# Patient Record
Sex: MALE | Race: WHITE | NOT HISPANIC OR LATINO | Employment: FULL TIME | URBAN - METROPOLITAN AREA
[De-identification: names, ages, dates, MRNs, and addresses within clinical notes are randomized per-mention and may not be internally consistent; named-entity substitution may affect disease eponyms.]

---

## 2017-02-10 ENCOUNTER — COMMUNICATION - HEALTHEAST (OUTPATIENT)
Dept: TELEHEALTH | Facility: CLINIC | Age: 52
End: 2017-02-10

## 2017-02-10 ENCOUNTER — OFFICE VISIT - HEALTHEAST (OUTPATIENT)
Dept: INTERNAL MEDICINE | Facility: CLINIC | Age: 52
End: 2017-02-10

## 2017-02-10 DIAGNOSIS — I10 ESSENTIAL HYPERTENSION: ICD-10-CM

## 2017-02-10 DIAGNOSIS — I10 ESSENTIAL HYPERTENSION WITH GOAL BLOOD PRESSURE LESS THAN 140/90: ICD-10-CM

## 2017-02-10 ASSESSMENT — MIFFLIN-ST. JEOR: SCORE: 2081.83

## 2017-03-09 ENCOUNTER — COMMUNICATION - HEALTHEAST (OUTPATIENT)
Dept: FAMILY MEDICINE | Facility: CLINIC | Age: 52
End: 2017-03-09

## 2017-03-09 DIAGNOSIS — I10 HYPERTENSION: ICD-10-CM

## 2018-02-22 ENCOUNTER — COMMUNICATION - HEALTHEAST (OUTPATIENT)
Dept: INTERNAL MEDICINE | Facility: CLINIC | Age: 53
End: 2018-02-22

## 2018-02-22 DIAGNOSIS — I10 HYPERTENSION WITH GOAL BLOOD PRESSURE LESS THAN 140/90: ICD-10-CM

## 2018-05-22 ENCOUNTER — RECORDS - HEALTHEAST (OUTPATIENT)
Dept: ADMINISTRATIVE | Facility: OTHER | Age: 53
End: 2018-05-22

## 2018-07-24 ENCOUNTER — COMMUNICATION - HEALTHEAST (OUTPATIENT)
Dept: INTERNAL MEDICINE | Facility: CLINIC | Age: 53
End: 2018-07-24

## 2018-07-24 DIAGNOSIS — I10 ESSENTIAL HYPERTENSION: ICD-10-CM

## 2018-09-06 ENCOUNTER — COMMUNICATION - HEALTHEAST (OUTPATIENT)
Dept: INTERNAL MEDICINE | Facility: CLINIC | Age: 53
End: 2018-09-06

## 2018-09-06 DIAGNOSIS — I10 HYPERTENSION WITH GOAL BLOOD PRESSURE LESS THAN 140/90: ICD-10-CM

## 2019-03-27 ENCOUNTER — COMMUNICATION - HEALTHEAST (OUTPATIENT)
Dept: FAMILY MEDICINE | Facility: CLINIC | Age: 54
End: 2019-03-27

## 2019-03-28 ENCOUNTER — COMMUNICATION - HEALTHEAST (OUTPATIENT)
Dept: TELEHEALTH | Facility: CLINIC | Age: 54
End: 2019-03-28

## 2019-03-28 ENCOUNTER — OFFICE VISIT - HEALTHEAST (OUTPATIENT)
Dept: FAMILY MEDICINE | Facility: CLINIC | Age: 54
End: 2019-03-28

## 2019-03-28 DIAGNOSIS — T75.3XXA MOTION SICKNESS, INITIAL ENCOUNTER: ICD-10-CM

## 2019-03-28 DIAGNOSIS — E66.01 MORBID OBESITY (H): ICD-10-CM

## 2019-03-28 DIAGNOSIS — I10 HYPERTENSION WITH GOAL BLOOD PRESSURE LESS THAN 140/90: ICD-10-CM

## 2019-03-28 DIAGNOSIS — Z12.5 SCREENING FOR PROSTATE CANCER: ICD-10-CM

## 2019-03-28 DIAGNOSIS — Z13.220 LIPID SCREENING: ICD-10-CM

## 2019-03-28 DIAGNOSIS — H61.23 HEARING LOSS DUE TO CERUMEN IMPACTION, BILATERAL: ICD-10-CM

## 2019-03-28 DIAGNOSIS — Z86.711 HISTORY OF PULMONARY EMBOLISM: ICD-10-CM

## 2019-03-28 DIAGNOSIS — R91.1 PULMONARY NODULE: ICD-10-CM

## 2019-03-28 ASSESSMENT — MIFFLIN-ST. JEOR: SCORE: 2118.8

## 2019-03-29 LAB
ANION GAP SERPL CALCULATED.3IONS-SCNC: 9 MMOL/L (ref 5–18)
BUN SERPL-MCNC: 22 MG/DL (ref 8–22)
CALCIUM SERPL-MCNC: 9.9 MG/DL (ref 8.5–10.5)
CHLORIDE BLD-SCNC: 104 MMOL/L (ref 98–107)
CHOLEST SERPL-MCNC: 204 MG/DL
CO2 SERPL-SCNC: 28 MMOL/L (ref 22–31)
CREAT SERPL-MCNC: 1.03 MG/DL (ref 0.7–1.3)
FASTING STATUS PATIENT QL REPORTED: NO
GFR SERPL CREATININE-BSD FRML MDRD: >60 ML/MIN/1.73M2
GLUCOSE BLD-MCNC: 95 MG/DL (ref 70–125)
HDLC SERPL-MCNC: 42 MG/DL
LDLC SERPL CALC-MCNC: 123 MG/DL
POTASSIUM BLD-SCNC: 4 MMOL/L (ref 3.5–5)
PSA SERPL-MCNC: 0.6 NG/ML (ref 0–3.5)
SODIUM SERPL-SCNC: 141 MMOL/L (ref 136–145)
TRIGL SERPL-MCNC: 196 MG/DL

## 2019-04-02 ENCOUNTER — COMMUNICATION - HEALTHEAST (OUTPATIENT)
Dept: FAMILY MEDICINE | Facility: CLINIC | Age: 54
End: 2019-04-02

## 2019-08-11 ENCOUNTER — COMMUNICATION - HEALTHEAST (OUTPATIENT)
Dept: FAMILY MEDICINE | Facility: CLINIC | Age: 54
End: 2019-08-11

## 2019-08-11 DIAGNOSIS — I10 HYPERTENSION WITH GOAL BLOOD PRESSURE LESS THAN 140/90: ICD-10-CM

## 2019-09-24 ENCOUNTER — RECORDS - HEALTHEAST (OUTPATIENT)
Dept: ADMINISTRATIVE | Facility: OTHER | Age: 54
End: 2019-09-24

## 2019-11-10 ENCOUNTER — COMMUNICATION - HEALTHEAST (OUTPATIENT)
Dept: FAMILY MEDICINE | Facility: CLINIC | Age: 54
End: 2019-11-10

## 2019-11-10 DIAGNOSIS — I10 HYPERTENSION WITH GOAL BLOOD PRESSURE LESS THAN 140/90: ICD-10-CM

## 2020-02-14 ENCOUNTER — COMMUNICATION - HEALTHEAST (OUTPATIENT)
Dept: SCHEDULING | Facility: CLINIC | Age: 55
End: 2020-02-14

## 2020-02-14 DIAGNOSIS — I10 HYPERTENSION WITH GOAL BLOOD PRESSURE LESS THAN 140/90: ICD-10-CM

## 2020-05-20 ENCOUNTER — COMMUNICATION - HEALTHEAST (OUTPATIENT)
Dept: SCHEDULING | Facility: CLINIC | Age: 55
End: 2020-05-20

## 2020-05-20 ENCOUNTER — COMMUNICATION - HEALTHEAST (OUTPATIENT)
Dept: FAMILY MEDICINE | Facility: CLINIC | Age: 55
End: 2020-05-20

## 2020-05-21 ENCOUNTER — OFFICE VISIT - HEALTHEAST (OUTPATIENT)
Dept: FAMILY MEDICINE | Facility: CLINIC | Age: 55
End: 2020-05-21

## 2020-05-21 ENCOUNTER — AMBULATORY - HEALTHEAST (OUTPATIENT)
Dept: NURSING | Facility: CLINIC | Age: 55
End: 2020-05-21

## 2020-05-21 ENCOUNTER — AMBULATORY - HEALTHEAST (OUTPATIENT)
Dept: LAB | Facility: CLINIC | Age: 55
End: 2020-05-21

## 2020-05-21 DIAGNOSIS — Z12.5 SCREENING FOR PROSTATE CANCER: ICD-10-CM

## 2020-05-21 DIAGNOSIS — I10 HYPERTENSION WITH GOAL BLOOD PRESSURE LESS THAN 140/90: ICD-10-CM

## 2020-05-21 DIAGNOSIS — Z13.220 LIPID SCREENING: ICD-10-CM

## 2020-05-21 DIAGNOSIS — E66.01 MORBID OBESITY (H): ICD-10-CM

## 2020-05-21 LAB
ANION GAP SERPL CALCULATED.3IONS-SCNC: 13 MMOL/L (ref 5–18)
BUN SERPL-MCNC: 19 MG/DL (ref 8–22)
CALCIUM SERPL-MCNC: 9.4 MG/DL (ref 8.5–10.5)
CHLORIDE BLD-SCNC: 101 MMOL/L (ref 98–107)
CHOLEST SERPL-MCNC: 233 MG/DL
CO2 SERPL-SCNC: 26 MMOL/L (ref 22–31)
CREAT SERPL-MCNC: 1.14 MG/DL (ref 0.7–1.3)
FASTING STATUS PATIENT QL REPORTED: YES
GFR SERPL CREATININE-BSD FRML MDRD: >60 ML/MIN/1.73M2
GLUCOSE BLD-MCNC: 91 MG/DL (ref 70–125)
HDLC SERPL-MCNC: 60 MG/DL
LDLC SERPL CALC-MCNC: 157 MG/DL
POTASSIUM BLD-SCNC: 4.2 MMOL/L (ref 3.5–5)
PSA SERPL-MCNC: 0.6 NG/ML (ref 0–3.5)
SODIUM SERPL-SCNC: 140 MMOL/L (ref 136–145)
TRIGL SERPL-MCNC: 80 MG/DL

## 2020-05-22 ENCOUNTER — COMMUNICATION - HEALTHEAST (OUTPATIENT)
Dept: FAMILY MEDICINE | Facility: CLINIC | Age: 55
End: 2020-05-22

## 2020-05-22 ENCOUNTER — AMBULATORY - HEALTHEAST (OUTPATIENT)
Dept: FAMILY MEDICINE | Facility: CLINIC | Age: 55
End: 2020-05-22

## 2020-05-22 DIAGNOSIS — E78.5 HYPERLIPIDEMIA, UNSPECIFIED HYPERLIPIDEMIA TYPE: ICD-10-CM

## 2020-05-22 DIAGNOSIS — I10 HYPERTENSION WITH GOAL BLOOD PRESSURE LESS THAN 140/90: ICD-10-CM

## 2020-08-19 ENCOUNTER — COMMUNICATION - HEALTHEAST (OUTPATIENT)
Dept: FAMILY MEDICINE | Facility: CLINIC | Age: 55
End: 2020-08-19

## 2020-08-19 DIAGNOSIS — E78.5 HYPERLIPIDEMIA, UNSPECIFIED HYPERLIPIDEMIA TYPE: ICD-10-CM

## 2020-08-19 RX ORDER — ATORVASTATIN CALCIUM 10 MG/1
10 TABLET, FILM COATED ORAL DAILY
Qty: 90 TABLET | Refills: 2 | Status: SHIPPED | OUTPATIENT
Start: 2020-08-19 | End: 2021-08-12 | Stop reason: SINTOL

## 2020-11-09 ENCOUNTER — COMMUNICATION - HEALTHEAST (OUTPATIENT)
Dept: SCHEDULING | Facility: CLINIC | Age: 55
End: 2020-11-09

## 2020-11-09 ENCOUNTER — OFFICE VISIT - HEALTHEAST (OUTPATIENT)
Dept: FAMILY MEDICINE | Facility: CLINIC | Age: 55
End: 2020-11-09

## 2020-11-09 DIAGNOSIS — H10.31 ACUTE CONJUNCTIVITIS OF RIGHT EYE, UNSPECIFIED ACUTE CONJUNCTIVITIS TYPE: ICD-10-CM

## 2020-11-09 DIAGNOSIS — I10 HYPERTENSION WITH GOAL BLOOD PRESSURE LESS THAN 140/90: ICD-10-CM

## 2020-11-09 DIAGNOSIS — Z20.822 EXPOSURE TO COVID-19 VIRUS: ICD-10-CM

## 2020-11-09 DIAGNOSIS — R05.8 DRY COUGH: ICD-10-CM

## 2020-11-10 ENCOUNTER — COMMUNICATION - HEALTHEAST (OUTPATIENT)
Dept: FAMILY MEDICINE | Facility: CLINIC | Age: 55
End: 2020-11-10

## 2020-11-10 DIAGNOSIS — I10 HYPERTENSION WITH GOAL BLOOD PRESSURE LESS THAN 140/90: ICD-10-CM

## 2020-11-12 ENCOUNTER — AMBULATORY - HEALTHEAST (OUTPATIENT)
Dept: FAMILY MEDICINE | Facility: CLINIC | Age: 55
End: 2020-11-12

## 2020-11-14 RX ORDER — METOPROLOL SUCCINATE 50 MG/1
50 TABLET, EXTENDED RELEASE ORAL DAILY
Qty: 90 TABLET | Refills: 3 | Status: SHIPPED | OUTPATIENT
Start: 2020-11-14 | End: 2021-08-12

## 2020-12-10 ENCOUNTER — VIRTUAL VISIT (OUTPATIENT)
Dept: FAMILY MEDICINE | Facility: OTHER | Age: 55
End: 2020-12-10

## 2020-12-10 NOTE — PROGRESS NOTES
"Date: 12/10/2020 12:00:03  Clinician: Kris James  Clinician NPI: [id number]  Patient: Randal Barajas  Patient : 1965  Patient Address: [address] [place], [place] [address], [place] [id number]  Patient Phone: [phone]  Visit Protocol: URI  Patient Summary:  Randal is a 54 year old ( : 1965 ) male who initiated a [facility] Visit for COVID-19 (Coronavirus) evaluation and screening. When asked the question \"Please sign me up to receive news, health information and promotions from [facility].\", Randal responded \"Yes\".    Randal states his symptoms started 1-2 days ago.   His symptoms consist of a headache, a cough, nasal congestion, nausea, vomiting, rhinitis, myalgia, malaise, a sore throat, and diarrhea. He is experiencing mild difficulty breathing with activities but can speak normally in full sentences.   Symptom details     Nasal secretions: The color of his mucus is clear.    Cough: Randal coughs every 5-10 minutes and his cough is more bothersome at night. Phlegm comes into his throat when he coughs. He does not believe his cough is caused by post-nasal drip. The color of the phlegm is white.     Sore throat: Randal reports having mild throat pain (1-3 on a 10 point pain scale), does not have exudate on his tonsils, and can swallow liquids. He is not sure if the lymph nodes in his neck are enlarged. A rash has not appeared on the skin since the sore throat started.     Headache: He states the headache is mild (1-3 on a 10 point pain scale).      Randal denies having ear pain, wheezing, fever, facial pain or pressure, chills, teeth pain, ageusia, and anosmia. He also denies having recent facial or sinus surgery in the past 60 days.   Precipitating events  Within the past week, Randal has not been exposed to someone with strep throat. He has not recently been exposed to someone with influenza. Randal has been in close contact with the following high risk individuals: children under the age of 5.   " Pertinent COVID-19 (Coronavirus) information  Randal does not work or volunteer as healthcare worker or a . In the past 14 days, Randal has not worked or volunteered at a healthcare facility or group living setting.   In the past 14 days, he also has not lived in a congregate living setting.   Randal has had a close contact with a laboratory-confirmed COVID-19 patient within 14 days of symptom onset. He was exposed at his work. Date Randal was exposed to the laboratory-confirmed COVID-19 patient: 12/06/2020   Additional information about contact with COVID-19 (Coronavirus) patient as reported by the patient (free text): My coworker is currently ill with COVID. He tested positive. I work side by side with him    Since December 2019, Randla has been tested for COVID-19 and has not had upper respiratory infection or influenza-like illness.      Result of COVID-19 test: Negative     Date of his COVID-19 test: 11/16/2020      Pertinent medical history  Randal has taken an antibiotic medication in the past month. Antibiotic details as reported by the patient (free text): Ciprofloxacin ophthalmic solution   He has not been told by his provider to avoid NSAIDs.   Randal does not have diabetes. He denies having immunosuppressive conditions (e.g., chemotherapy, HIV, organ transplant, long-term use of steroids or other immunosuppressive medications, splenectomy). He does not have severe COPD and congestive heart failure. He does not have asthma.   Randal needs a return to work/school note.   Weight: 285 lbs   Randal does not smoke or use smokeless tobacco.   Additional information as reported by the patient (free text): Last month my right eye was infected. I used the drops you prescribed. Well two days ago the infection is back again. My eyes is matted shut every morning. It is also swollen. I was told by last doctor this could be covid   Weight: 285 lbs    MEDICATIONS: lisinopril-hydrochlorothiazide  oral, atorvastatin oral, ALLERGIES: NKDA  Clinician Response:  Dear Randal,   Your symptoms show that you may have coronavirus (COVID-19). This illness can cause fever, cough and trouble breathing. Many people get a mild case and get better on their own. Some people can get very sick.  What should I do?  We would like to test you for this virus.   1. Please call [phone] to schedule your visit. Explain that you were referred by [facility] to have a COVID-19 test. Be ready to share your [facility] visit ID number.  * If you need to schedule in [facility] please call [phone] or for [employer] employees please call [phone].  * If you need to schedule in the [place] area please call [phone]. [employer] employees call [phone].  The following will serve as your written order for this COVID Test, ordered by me, for the indication of suspected COVID [Z20.828]: The test will be ordered in [employer], our electronic health record, after you are scheduled. It will show as ordered and authorized by Hernan Goldman MD.  Order: COVID-19 (Coronavirus) PCR for SYMPTOMATIC testing from [facility].   2. When it's time for your COVID test:  Stay at least 6 feet away from others. (If someone will drive you to your test, stay in the backseat, as far away from the  as you can.)   Cover your mouth and nose with a mask, tissue or washcloth.  Go straight to the testing site. Don't make any stops on the way there or back.      3.Starting now: Stay home and away from others (self-isolate) until:   You've had no fever---and no medicine that reduces fever---for one full day (24 hours). And...   Your other symptoms have gotten better. For example, your cough or breathing has improved. And...   At least 10 days have passed since your symptoms started.       During this time, don't leave the house except for testing or medical care.   Stay in your own room, even for meals. Use your own bathroom if you can.   Stay away from others in your  "home. No hugging, kissing or shaking hands. No visitors.  Don't go to work, school or anywhere else.    Clean \"high touch\" surfaces often (doorknobs, counters, handles, etc.). Use a household cleaning spray or wipes. You'll find a full list of  on the EPA website: www.epa.gov/pesticide-registration/list-n-disinfectants-use-against-sars-cov-2.   Cover your mouth and nose with a mask, tissue or washcloth to avoid spreading germs.  Wash your hands and face often. Use soap and water.  Caregivers in these groups are at risk for severe illness due to COVID-19:  o People 65 years and older  o People who live in a nursing home or long-term care facility  o People with chronic disease (lung, heart, cancer, diabetes, kidney, liver, immunologic)  o People who have a weakened immune system, including those who:   Are in cancer treatment  Take medicine that weakens the immune system, such as corticosteroids  Had a bone marrow or organ transplant  Have an immune deficiency  Have poorly controlled HIV or AIDS  Are obese (body mass index of 40 or higher)  Smoke regularly   o Caregivers should wear gloves while washing dishes, handling laundry and cleaning bedrooms and bathrooms.  o Use caution when washing and drying laundry: Don't shake dirty laundry, and use the warmest water setting that you can.  o For more tips, go to www.cdc.gov/coronavirus/2019-ncov/downloads/10Things.pdf.    How can I take care of myself?    Get lots of rest. Drink extra fluids (unless a doctor has told you not to).   Take Tylenol (acetaminophen) for fever or pain. If you have liver or kidney problems, ask your family doctor if it's okay to take Tylenol.   Adults can take either:    650 mg (two 325 mg pills) every 4 to 6 hours, or...   1,000 mg (two 500 mg pills) every 8 hours as needed.    Note: Don't take more than 3,000 mg in one day. Acetaminophen is found in many medicines (both prescribed and over-the-counter medicines). Read all labels to be " sure you don't take too much.   For children, check the Tylenol bottle for the right dose. The dose is based on the child's age or weight.    If you have other health problems (like cancer, heart failure, an organ transplant or severe kidney disease): Call your specialty clinic if you don't feel better in the next 2 days.       Know when to call 911. Emergency warning signs include:    Trouble breathing or shortness of breath Pain or pressure in the chest that doesn't go away Feeling confused like you haven't felt before, or not being able to wake up Bluish-colored lips or face.  Where can I get more information?   [facility] -- About COVID-19: [email or web address]   CDC -- What to Do If You're Sick: www.cdc.gov/coronavirus/2019-ncov/about/steps-when-sick.html   [facility] -- Ending Home Isolation: www.cdc.gov/coronavirus/2019-ncov/hcp/disposition-in-home-patients.html   [facility] -- Caring for Someone: www.cdc.gov/coronavirus/2019-ncov/if-you-are-sick/care-for-someone.html   [facility] -- Interim Guidance for Hospital Discharge to Home: www.health.[facility].mn.us/diseases/coronavirus/hcp/hospdischarge.pdf   [facility] clinical trials (COVID-19 research studies): clinicalaffairs.[facility]-clinical-trials    Below are the COVID-19 hotlines at the [facility] of Health ([facility]). Interpreters are available.    For health questions: Call 596-808-0157 or 1-540.798.2283 (7 a.m. to 7 p.m.) For questions about schools and childcare: Call 120-155-0362 or 1-874.613.1800 (7 a.m. to 7 p.m.)    Diagnosis: Contact with and (suspected) exposure to other viral communicable diseases  Diagnosis ICD: Z20.828

## 2021-01-27 ENCOUNTER — OFFICE VISIT - HEALTHEAST (OUTPATIENT)
Dept: FAMILY MEDICINE | Facility: CLINIC | Age: 56
End: 2021-01-27

## 2021-01-27 DIAGNOSIS — R21 RASH AND NONSPECIFIC SKIN ERUPTION: ICD-10-CM

## 2021-01-27 DIAGNOSIS — H10.9 CONJUNCTIVITIS OF RIGHT EYE, UNSPECIFIED CONJUNCTIVITIS TYPE: ICD-10-CM

## 2021-01-27 DIAGNOSIS — R05.8 COUGH DUE TO ACE INHIBITOR: ICD-10-CM

## 2021-01-27 DIAGNOSIS — I10 HYPERTENSION WITH GOAL BLOOD PRESSURE LESS THAN 140/90: ICD-10-CM

## 2021-01-27 DIAGNOSIS — T46.4X5A COUGH DUE TO ACE INHIBITOR: ICD-10-CM

## 2021-01-27 DIAGNOSIS — N52.2 DRUG-INDUCED ERECTILE DYSFUNCTION: ICD-10-CM

## 2021-01-27 DIAGNOSIS — Z23 FLU VACCINE NEED: ICD-10-CM

## 2021-01-27 RX ORDER — TADALAFIL 20 MG/1
20 TABLET ORAL DAILY PRN
Qty: 30 TABLET | Status: SHIPPED | OUTPATIENT
Start: 2021-01-27 | End: 2022-04-02

## 2021-01-27 RX ORDER — BETAMETHASONE DIPROPIONATE 0.05 %
OINTMENT (GRAM) TOPICAL
Qty: 30 G | Refills: 0 | Status: SHIPPED | OUTPATIENT
Start: 2021-01-27 | End: 2021-11-23

## 2021-01-27 ASSESSMENT — MIFFLIN-ST. JEOR: SCORE: 2163.03

## 2021-01-29 LAB
VARICELLA ZOSTER DNA COMMENT: NORMAL
VZV DNA SPEC QL NAA+PROBE: NORMAL
VZV PCR SPECIMEN: NORMAL

## 2021-04-25 ENCOUNTER — COMMUNICATION - HEALTHEAST (OUTPATIENT)
Dept: FAMILY MEDICINE | Facility: CLINIC | Age: 56
End: 2021-04-25

## 2021-04-25 DIAGNOSIS — I10 HYPERTENSION WITH GOAL BLOOD PRESSURE LESS THAN 140/90: ICD-10-CM

## 2021-04-26 RX ORDER — LOSARTAN POTASSIUM AND HYDROCHLOROTHIAZIDE 25; 100 MG/1; MG/1
1 TABLET ORAL DAILY
Qty: 90 TABLET | Refills: 0 | Status: SHIPPED | OUTPATIENT
Start: 2021-04-26 | End: 2021-08-05

## 2021-05-27 VITALS — DIASTOLIC BLOOD PRESSURE: 120 MMHG | OXYGEN SATURATION: 95 % | SYSTOLIC BLOOD PRESSURE: 158 MMHG | HEART RATE: 99 BPM

## 2021-05-27 NOTE — PATIENT INSTRUCTIONS - HE
Check your blood pressure periodically and if you continue to be greater than 140/90, come on in so we can adjust your medications. Weight loss will be a big help in controlling your blood pressure.    You will get a call about getting that CT scan set up to follow up on that pulmonary nodule that we talked about.    You can use an over the counter kit called Debrox which helps to soften your ear wax so it doesn't get stuck like it has.  Avoid using q-tips as this can make it worse.    Patient Education   Scopolamine Transdermal patch - 72 hour  What is this medicine?  SCOPOLAMINE (skoe MEENA a meen) is used to prevent nausea and vomiting caused by motion sickness, anesthesia and surgery.  This medicine may be used for other purposes; ask your health care provider or pharmacist if you have questions.  What should I tell my health care provider before I take this medicine?  They need to know if you have any of these conditions:    glaucoma    kidney or liver disease    an unusual or allergic reaction (especially skin allergy) to scopolamine, atropine, other medicines, foods, dyes, or preservatives    pregnant or trying to get pregnant    breast-feeding  How should I use this medicine?  This medicine is for external use only. Follow the directions on the prescription label. One patch contains enough medicine to prevent motion sickness for up to 3 days. Apply the patch at least 4 hours before you need it and only wear one disc at a time. Choose an area behind the ear, that is clean, dry, hairless and free from any cuts or irritation. Wipe the area with a clean dry tissue. Peel off the plastic backing of the skin patch, trying not to touch the adhesive side with your hands. Do not cut the patches. Firmly apply to the area you have chosen, with the metallic side of the patch to the skin and the tan-colored side showing. Once firmly in place, wash your hands well with soap and water. Remove the disc after 3 days, or sooner  if you no longer need it. After removing the patch, wash your hands and the area behind your ear thoroughly with soap and water. The patch will still contain some medicine after use. To avoid accidental contact or ingestion by children or pets, fold the used patch in half with the sticky side together and throw away in the trash out of the reach of children and pets. If you need to use a second patch after you remove the first, place it behind the other ear.  Talk to your pediatrician regarding the use of this medicine in children. Special care may be needed.  Overdosage: If you think you have taken too much of this medicine contact a poison control center or emergency room at once.  NOTE: This medicine is only for you. Do not share this medicine with others.  What if I miss a dose?  Make sure you apply the patch at least 4 hours before you need it. You can apply it the night before traveling.  What may interact with this medicine?    benztropine    bethanechol    medicines for anxiety or sleeping problems like diazepam or temazepam    medicines for hay fever and other allergies    medicines for mental depression    muscle relaxants  This list may not describe all possible interactions. Give your health care provider a list of all the medicines, herbs, non-prescription drugs, or dietary supplements you use. Also tell them if you smoke, drink alcohol, or use illegal drugs. Some items may interact with your medicine.  What should I watch for while using this medicine?  Keep the patch dry, if possible, to prevent it from falling off. Limited contact with water, however, as in bathing or swimming, will not affect the system. If the patch falls off, throw it away and put a new one behind the other ear.  You may get drowsy or dizzy. Do not drive, use machinery, or do anything that needs mental alertness until you know how this medicine affects you. Do not stand or sit up quickly, especially if you are an older patient.  This reduces the risk of dizzy or fainting spells. Alcohol may interfere with the effect of this medicine. Avoid alcoholic drinks.  Your mouth may get dry. Chewing sugarless gum or sucking hard candy, and drinking plenty of water may help. Contact your doctor if the problem does not go away or is severe.  This medicine may cause dry eyes and blurred vision. If you wear contact lenses you may feel some discomfort. Lubricating drops may help. See your eye doctor if the problem does not go away or is severe.  If you are going to have a magnetic resonance imaging (MRI) procedure, tell your MRI technician if you have this patch on your body. It must be removed before a MRI.  What side effects may I notice from receiving this medicine?  Side effects that you should report to your doctor or health care professional as soon as possible:    agitation, nervousness, confusion    blurred vision and other eye problems    dizziness, drowsiness    eye pain or redness in the whites of the eye    hallucinations    pain or difficulty passing urine    skin rash, itching    vomiting  Side effects that usually do not require medical attention (report to your doctor or health care professional if they continue or are bothersome):    headache    nausea  This list may not describe all possible side effects. Call your doctor for medical advice about side effects. You may report side effects to FDA at 4-487-FDA-6191.  Where should I keep my medicine?  Keep out of the reach of children.  Store at room temperature between 20 and 25 degrees C (68 and 77 degrees F). Throw away any unused medicine after the expiration date. When you remove a patch, fold it and throw it in the trash as described above.  NOTE: This sheet is a summary. It may not cover all possible information. If you have questions about this medicine, talk to your doctor, pharmacist, or health care provider.  NOTE:This sheet is a summary. It may not cover all possible information.  If you have questions about this medicine, talk to your doctor, pharmacist, or health care provider. Copyright  2015 [employer]

## 2021-05-27 NOTE — PROGRESS NOTES
"Chief Complaint   Patient presents with     Medication Request     Would like medication for motion sickness since he is leaving for 2 weeks on a cruise.      Medication Refill     Waterpill       HPI: Patient presents today requesting medication for motion sickness as he is going to be leaving on a cruise tomorrow.  He is taking oral medication (he is unsure of what kind, but possibly Dramamine) in the past which is only been mildly effective.  He says that he heard from a friend that there is a patch that he could try and wonders if he could get that today.    The patient is hypertensive in clinic.  He does have prescriptions for hydrochlorothiazide and metoprolol, but admits that he has not been taking his hydrochlorothiazide as he has run out.  He is overdue for an office visit and med check.  No chest pain, palpitations, lightheadedness, dizziness, numbness, tingling, gait instability, vision changes.    The patient has a history of a 3 mm pulmonary nodule found on CTA when he was diagnosed with pulmonary emboli in the past.  In regards to the embolism, no current anticoagulation.  No recurrence of clot.  If the PE occurred postoperatively.  Patient has no smoking history.  Pulmonary nodule looked low risk.  He is interested in following up on this.    ROS:Review of Systems - History obtained from the patient  General ROS: negative  Ophthalmic ROS: negative  ENT ROS: negative  Respiratory ROS: negative  Cardiovascular ROS: negative  Gastrointestinal ROS: negative  Neurological ROS: negative    SH: The Patient's  reports that  has never smoked. he has never used smokeless tobacco.      FH: The Patient's family history is not on file.     Meds:    No current outpatient medications on file prior to visit.     No current facility-administered medications on file prior to visit.        O:  BP (!) 142/104   Pulse 95   Temp 98.1  F (36.7  C) (Oral)   Ht 5' 10.5\" (1.791 m)   Wt (!) 281 lb (127.5 kg)   SpO2 95%   " BMI 39.75 kg/m      Physical Examination:   General appearance - alert, well appearing, and in no distress  Mental status - alert, oriented to person, place, and time  Eyes - pupils equal and reactive, extraocular eye movements intact  Ears -large bilateral cerumen impaction.  Canals normal.  Nose - normal and patent, no erythema, discharge or polyps  Mouth - mucous membranes moist, pharynx normal without lesions  Neck - supple, no significant adenopathy  Lymphatics - no palpable lymphadenopathy, no hepatosplenomegaly  Chest - clear to auscultation, no wheezes, rales or rhonchi, symmetric air entry  Heart - normal rate and regular rhythm, S1 and S2 normal, no murmurs noted  Abdomen - soft, nontender, nondistended, no masses or organomegaly  Neurological - alert, oriented, normal speech, no focal findings or movement disorder noted, neck supple without rigidity, cranial nerves II through XII intact, motor and sensory grossly normal bilaterally, normal muscle tone, no tremors, strength 5/5  Extremities - peripheral pulses normal, no pedal edema, no clubbing or cyanosis  Skin - normal coloration and turgor, no rashes, no suspicious skin lesions noted      A/P:     Problem List Items Addressed This Visit        ENT/CARD/PULM/ENDO Problems    Hypertension with goal blood pressure less than 140/90 - Primary    Relevant Medications    hydroCHLOROthiazide (HYDRODIURIL) 25 MG tablet    metoprolol succinate (TOPROL-XL) 50 MG 24 hr tablet    Other Relevant Orders    Basic Metabolic Panel    Pulmonary nodule    Relevant Orders    CT Chest Without Contrast       Other    History of pulmonary embolism    Obesity (BMI 35.0-39.9) with comorbidity (H)      Other Visit Diagnoses     Motion sickness, initial encounter        Relevant Medications    scopolamine (TRANSDERM-SCOP) 1.5 mg transdermal patch    Screening for prostate cancer        Relevant Orders    PSA (Prostatic-Specific Antigen), Annual Screen    Lipid screening         Relevant Orders    Lipid [place] RANDOM    Hearing loss due to cerumen impaction, bilateral                1. Hypertension with goal blood pressure less than 140/90  Uncontrolled, most likely due to not taking antihypertensives due to running out.    - hydroCHLOROthiazide (HYDRODIURIL) 25 MG tablet; Take 0.5 tablets (12.5 mg total) by mouth daily.  Dispense: 45 tablet; Refill: 1  - metoprolol succinate (TOPROL-XL) 50 MG 24 hr tablet; Take 1 tablet (50 mg total) by mouth daily.  Dispense: 90 tablet; Refill: 1  - Basic Metabolic Panel    2. History of pulmonary embolism  Plans on going on a plane trip.  Encourage movement during his trip.  Consider prophylactic aspirin.    3. Pulmonary nodule  Most likely benign pulmonary nodule, but patient is interested in following up which is reasonable.  If it continues to be stable, no further follow-up will be needed.  - CT Chest Without Contrast; Future    4. Motion sickness, initial encounter  Prescription for scopolamine patch sent to the pharmacy.  Discussed potential side effects and how to use.    - scopolamine (TRANSDERM-SCOP) 1.5 mg transdermal patch; Place 1 patch on the skin every third day.  Dispense: 7 patch; Refill: 0    5. Screening for prostate cancer  - PSA (Prostatic-Specific Antigen), Annual Screen    6. Lipid screening  - Lipid [place] RANDOM    7. Hearing loss due to cerumen impaction, bilateral  A large amount of cerumen was removed bilaterally initially with lavage to loosen and then manually manipulated out with lighted curette.    8. Morbid obesity (H)  Discussed with weight loss options and encouraged lifestyle changes.        Willian Wright, CNP

## 2021-05-27 NOTE — TELEPHONE ENCOUNTER
Pt leaving 3/29/19 for vacation. Hasn't seen dr Gloria since 2016 and all other doctors that he's seen are no longer working at this location. Appt has been scheduled for tomorrow. Will discuss request at that time. H.DeGree, CMA

## 2021-05-27 NOTE — TELEPHONE ENCOUNTER
Medication Request  Medication name: motion sickness patches   Pharmacy Name and Location:  [facility]   Reason for request: Pt leaving 3/22/19  for  cruise x 14 days, Pt requesting patches for motion sickness, Please call patient and advise  When did you use medication last ?: ?     Okay to leave a detailed message: yes

## 2021-05-30 ENCOUNTER — RECORDS - HEALTHEAST (OUTPATIENT)
Dept: ADMINISTRATIVE | Facility: CLINIC | Age: 56
End: 2021-05-30

## 2021-05-30 VITALS — HEIGHT: 72 IN | WEIGHT: 267.6 LBS | BODY MASS INDEX: 36.24 KG/M2

## 2021-05-31 ENCOUNTER — RECORDS - HEALTHEAST (OUTPATIENT)
Dept: ADMINISTRATIVE | Facility: CLINIC | Age: 56
End: 2021-05-31

## 2021-05-31 NOTE — TELEPHONE ENCOUNTER
Medication refill request denied: requested refill too early      Disp Refills Start End    metoprolol succinate (TOPROL-XL) 50 MG 24 hr tablet 90 tablet 1 3/28/2019     Sig - Route: Take 1 tablet (50 mg total) by mouth daily. - Oral    Sent to pharmacy as: metoprolol succinate (TOPROL-XL) 50 MG 24 hr tablet    E-Prescribing Status: Receipt confirmed by pharmacy (3/28/2019 12:34 PM CDT)      Faye Renner RN [facility] Triage/Med Refill 8/11/2019 6:59 PM

## 2021-06-02 VITALS — HEIGHT: 71 IN | WEIGHT: 281 LBS | BODY MASS INDEX: 39.34 KG/M2

## 2021-06-03 NOTE — TELEPHONE ENCOUNTER
Refill Approved    Rx renewed per Medication Renewal Policy. Medication was last renewed on 3/28/19.    Shanna Goldman, [facility] Triage/Med Refill 11/10/2019     Requested Prescriptions   Pending Prescriptions Disp Refills     hydroCHLOROthiazide (HYDRODIURIL) 25 MG tablet [Pharmacy Med Name: HYDROCHLOROTHIAZIDE 25MG TABLETS] 45 tablet 0     Sig: TAKE 1/2 TABLET(12.5 MG) BY MOUTH DAILY       Diuretics/Combination Diuretics Refill Protocol  Passed - 11/10/2019  1:29 PM        Passed - Visit with PCP or prescribing provider visit in past 12 months     Last office visit with prescriber/PCP: 3/28/2019 Willian Wright CNP OR same dept: 3/28/2019 Willian Wright CNP OR same specialty: 3/28/2019 Willian Wright CNP  Last physical: Visit date not found Last MTM visit: Visit date not found   Next visit within 3 mo: Visit date not found  Next physical within 3 mo: Visit date not found  Prescriber OR PCP: Willian Wright CNP  Last diagnosis associated with med order: 1. Hypertension with goal blood pressure less than 140/90  - hydroCHLOROthiazide (HYDRODIURIL) 25 MG tablet [Pharmacy Med Name: HYDROCHLOROTHIAZIDE 25MG TABLETS]; TAKE 1/2 TABLET(12.5 MG) BY MOUTH DAILY  Dispense: 45 tablet; Refill: 0    If protocol passes may refill for 12 months if within 3 months of last provider visit (or a total of 15 months).             Passed - Serum Potassium in past 12 months      Lab Results   Component Value Date    Potassium 3.7 06/28/2019             Passed - Serum Sodium in past 12 months      Lab Results   Component Value Date    Sodium 136 06/28/2019             Passed - Blood pressure on file in past 12 months     BP Readings from Last 1 Encounters:   06/28/19 (!) 155/102             Passed - Serum Creatinine in past 12 months      Creatinine   Date Value Ref Range Status   06/28/2019 0.87 0.70 - 1.30 mg/dL Final

## 2021-06-04 VITALS — WEIGHT: 270 LBS | BODY MASS INDEX: 36.62 KG/M2

## 2021-06-05 VITALS
TEMPERATURE: 98.8 F | OXYGEN SATURATION: 97 % | HEIGHT: 71 IN | HEART RATE: 85 BPM | BODY MASS INDEX: 40.46 KG/M2 | WEIGHT: 289 LBS | DIASTOLIC BLOOD PRESSURE: 84 MMHG | RESPIRATION RATE: 20 BRPM | SYSTOLIC BLOOD PRESSURE: 116 MMHG

## 2021-06-06 NOTE — TELEPHONE ENCOUNTER
Refill Approved    Rx renewed per Medication Renewal Policy. Medication was last renewed on 11/10/19.    Susie Sykes, [facility] Triage/Med Refill 2/17/2020     Requested Prescriptions   Pending Prescriptions Disp Refills     hydroCHLOROthiazide (HYDRODIURIL) 25 MG tablet 45 tablet 0     Sig: TAKE 1/2 TABLET(12.5 MG) BY MOUTH DAILY       Diuretics/Combination Diuretics Refill Protocol  Passed - 2/14/2020  8:15 AM        Passed - Visit with PCP or prescribing provider visit in past 12 months     Last office visit with prescriber/PCP: 3/28/2019 Willian Wright CNP OR same dept: Visit date not found OR same specialty: Visit date not found  Last physical: Visit date not found Last MTM visit: Visit date not found   Next visit within 3 mo: Visit date not found  Next physical within 3 mo: Visit date not found  Prescriber OR PCP: Willian Wright CNP  Last diagnosis associated with med order: 1. Hypertension with goal blood pressure less than 140/90  - hydroCHLOROthiazide (HYDRODIURIL) 25 MG tablet  Dispense: 45 tablet; Refill: 0    If protocol passes may refill for 12 months if within 3 months of last provider visit (or a total of 15 months).             Passed - Serum Potassium in past 12 months      Lab Results   Component Value Date    Potassium 3.7 06/28/2019             Passed - Serum Sodium in past 12 months      Lab Results   Component Value Date    Sodium 136 06/28/2019             Passed - Blood pressure on file in past 12 months     BP Readings from Last 1 Encounters:   06/28/19 (!) 155/102             Passed - Serum Creatinine in past 12 months      Creatinine   Date Value Ref Range Status   06/28/2019 0.87 0.70 - 1.30 mg/dL Final

## 2021-06-08 NOTE — PROGRESS NOTES
ASSESSMENT:  1. Essential hypertension with goal blood pressure less than 140/90  Poor control on low-dose hydrochlorothiazide.  Tachycardia and sleep apnea noted.  Would begin beta blocker.  Discussed goal readings    Reviewed echocardiogram showing left ventricular hypertrophy and hyperdynamic which would probably be helped by a beta blocker also    2. Essential hypertension  He does describe some element of salt sensitivity so increasing hydrochlorothiazide would seem reasonable  - hydroCHLOROthiazide (HYDRODIURIL) 25 MG tablet; Take 1 tablet (25 mg total) by mouth daily.  Dispense: 90 tablet; Refill: 3    3.  Sleep apnea area discussed probable sleep apnea and that following through with this evaluation is a good idea regarding his high blood pressure    PLAN:  Patient Instructions   Standard 2 approaches for high blood pressure are water pill or beta blocker    Metoprolol 50 mgs once a day in the morning.  Goal blood pressure is 120-140 over 70-80 and no higher than 140/90    Goal pulse with metoprolol is 60-70, and ideally not below 55    Range of metoprolol is 25 mgs up to 200 mgs    People who are sensitive to salt do well on water pills    Increase HCTZ to 25 mgs a full pill each morning    Virtually all medicines show most of their effect within 2-3 days      No orders of the defined types were placed in this encounter.    Medications Discontinued During This Encounter   Medication Reason     hydroCHLOROthiazide (HYDRODIURIL) 25 MG tablet Reorder       No Follow-up on file.    CHIEF COMPLAINT:  Chief Complaint   Patient presents with     Hypertension       HISTORY OF PRESENT ILLNESS:  Randal is a 51 y.o. male presenting to the clinic today with concerns of hypertension. He is normally followed by Dr. Gloria. He recently went to the dentist and was told that his blood pressure was too elevated to due work. He recalls that his blood pressure was 170/111 at the dentist's. He was placed on HCTZ 12.5 mg last  year while he was in the hospital secondary to DVT and pulmonary emboli. He recalls that for the last 10 years his heart rate and has been elevated since he stopped running. He denies feeling unwell but mentions intermittent dizziness and light headedness and tinnitus. He believes that he retains salt and water as his watch and ring occasionally become tight when he has some peripheral edema. He also notes that he seems to hold more fluid after drinking beer.    Sleep apnea: He snores at night and his wife has informed him that his snoring is very loud. She has asked him to sleep in another room as his snoring has become too intrusive on her sleep. He has scheduled to see the sleep clinic but missed the appointment and plans on rescheduling  sometime soon.     REVIEW OF SYSTEMS:   He has not been on any blood thinners since March.   All other systems are negative.    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     TOBACCO USE:  History   Smoking Status     Never Smoker   Smokeless Tobacco     Not on file       VITALS:  Vitals:    02/10/17 1338   BP: 136/88   Pulse: (!) 112   Weight: (!) 267 lb 9.6 oz (121.4 kg)   Height: 6' (1.829 m)     Wt Readings from Last 3 Encounters:   02/10/17 (!) 267 lb 9.6 oz (121.4 kg)   11/02/16 (!) 267 lb (121.1 kg)   01/28/16 (!) 272 lb (123.4 kg)       PHYSICAL EXAM:  Constitutional:  Reveals an alert, pleasant, talkative middle aged male.  Vitals:  Per nursing notes.  Ears:  Small amount of cerumen in the right ear. Left ear clear.  Oropharynx:  Narrow pharynx. Without posterior nasal drainage or thrush.  Neck:  Supple, thyroid not palpable.  Cardiac:  Regular rate and rhythm without murmurs, rubs, or gallops.  Legs without edema. Palpation of the radial pulse regular.  Lungs: Clear.  Respiratory effort normal.  Neurologic:  Cranial nerves II-XII intact.     Psychiatric:  Mood appropriate, memory intact.     QUALITY MEASURES:  The following high BMI  interventions were performed this visit: encouragement to exercise    ADDITIONAL HISTORY SUMMARIZED (2): Notes from Dr. Malone reviewed from 11/2016 regarding hypertension. Discharge summary from 12/2015 reviewed regarding DVT  DECISION TO OBTAIN EXTRA INFORMATION (1): Care everywhere accessed.   RADIOLOGY TESTS (1): None.  LABS (1): None.  MEDICINE TESTS (1): Echocardiogram reviewed from 11/2015.  INDEPENDENT REVIEW (2 each): None.     The visit lasted a total of 20 minutes face to face with the patient. Over 50% of the time was spent counseling and educating the patient about hypertension.    ICallum, am scribing for and in the presence of, Dr. Thompson.    I, Dr. Thompson, personally performed the services described in this documentation, as scribed by Callum Bridges in my presence, and it is both accurate and complete.    MEDICATIONS:  Current Outpatient Prescriptions   Medication Sig Dispense Refill     hydroCHLOROthiazide (HYDRODIURIL) 25 MG tablet Take 1 tablet (25 mg total) by mouth daily. 90 tablet 3     metoprolol succinate (TOPROL-XL) 50 MG 24 hr tablet Take 1 tablet (50 mg total) by mouth daily. 90 tablet 3     No current facility-administered medications for this visit.        Total data points: 4.   TIME IN:1:51 PM  TIME OUT: 2:11 PM

## 2021-06-08 NOTE — PROGRESS NOTES
"Randal Barajas is a 54 y.o. male who is being evaluated via a billable video visit.      The patient has been notified of following:     \"This video visit will be conducted via a call between you and your physician/provider. We have found that certain health care needs can be provided without the need for an in-person physical exam.  This service lets us provide the care you need with a video conversation.  If a prescription is necessary we can send it directly to your pharmacy.  If lab work is needed we can place an order for that and you can then stop by our lab to have the test done at a later time.    Video visits are billed at different rates depending on your insurance coverage. Please reach out to your insurance provider with any questions.    If during the course of the call the physician/provider feels a video visit is not appropriate, you will not be charged for this service.\"    Patient has given verbal consent to a Video visit? Yes    Patient would like to receive their AVS by AVS Preference: Deysi.    Patient would like the video invitation sent by: Text to cell phone: [phone].    Will anyone else be joining your video visit? No        Video Start Time: 0900    Patient presents today for medication check.  It has been >1-year since I have last seen him.  At that time he was hypertensive but had run out of his metoprolol and hydrochlorothiazide.  Refills were sent to the pharmacy.  The patient admits that within the last year he did stop taking them because he felt like he did not want to take daily medication.  No symptoms with this.  He did not check his blood pressure with it.  He notes that he then restarted them on his own.    Patient denies chest pain, palpitations, lightheadedness, dizziness, numbness, tingling, headache, vision changes.    Patient does not check his blood pressure at home.  He says that when he goes to the dentist he was told that his blood pressure is elevated.  He does " not have any specific numbers.    He has a history of a pulmonary nodule found incidentally on scanned for PE years ago.  We have talked about rescanning it if he was interested last year, but at this point he does not think he is.    He continues to struggle with obesity.  Exercise is difficult and his diet is indiscriminate more often than not.    Additional provider notes: GENERAL: Healthy, alert and no distress  EYES: Eyes grossly normal to inspection. No discharge or erythema, or obvious scleral/conjunctival abnormalities.  RESP: No audible wheeze, cough, or visible cyanosis.  No visible retractions or increased work of breathing.    NEURO: Cranial nerves grossly intact. Mentation and speech appropriate for age.  PSYCH: Mentation appears normal, affect normal/bright, judgement and insight intact, normal speech and appearance well-groomed    1. Hypertension with goal blood pressure less than 140/90  Basic Metabolic Panel   2. Obesity (BMI 35.0-39.9) with comorbidity (H)     3. Screening for prostate cancer  PSA, Diagnostic (Prostatic-Specific Antigen)   4. Lipid screening  Lipid Cascade RANDOM     Persistently uncontrolled blood pressure based on readings from the dentist office.  I would like for him to come in for nurse only blood pressure check and update lab so that we can get accurate numbers and adjust from there.  Weight loss encouraged.    Video-Visit Details    Type of service:  Video Visit    Video End Time (time video stopped): 0925  Originating Location (pt. Location): Home    Distant Location (provider location):  [facility]/OB     Platform used for Video Visit: Vinny Wright, ELIZA

## 2021-06-08 NOTE — PROGRESS NOTES
I met with Randal Barajas at the request of Willian Wright NP to recheck his blood pressure.  Blood pressure medications on the MAR were reviewed with patient.    Patient has taken all medications as per usual regimen: He has been taking medications for the past 2 weeks.   Patient reports tolerating them without any issues or concerns: Yes    Vitals:    05/21/20 1350 05/21/20 1352   BP: (!) 160/120 (!) 158/120   Pulse: (!) 101 99   SpO2: 95% 95%       After 5 minutes, the patient's blood pressure remained greater than or equal to 140/90.    Is the patient currently having any chest pain?  No  Does the patient currently have a headache?  No   the patient currently have any vision changes? No  Does the patient currently have any nausea? No  Does the patient currently have any abdominal pain? No    Patient was instructed to Per Willian Wright NP pt to go home. PCP will call him tomorrow. .

## 2021-06-08 NOTE — TELEPHONE ENCOUNTER
Left message stating pt is due for bp check. Please help schedule with Willian Wright CNP this week. Video visits are preferable (all we need is a smartphone) but if pt is not able to then telephone visit is fine.     Pearl Araujo, CMA

## 2021-06-10 NOTE — TELEPHONE ENCOUNTER
Refill Approved    Rx renewed per Medication Renewal Policy. Medication was last renewed on 5/22/20.    June Walters, [facility] Connection Triage/Med Refill 8/19/2020     Requested Prescriptions   Pending Prescriptions Disp Refills     atorvastatin (LIPITOR) 10 MG tablet 90 tablet 0     Sig: Take 1 tablet (10 mg total) by mouth daily.       Statins Refill Protocol (Hmg CoA Reductase Inhibitors) Passed - 8/19/2020  7:23 AM        Passed - PCP or prescribing provider visit in past 12 months      Last office visit with prescriber/PCP: 3/28/2019 Willian Wright CNP OR same dept: Visit date not found OR same specialty: 3/28/2019 Willian Wright CNP  Last physical: Visit date not found Last MTM visit: Visit date not found   Next visit within 3 mo: Visit date not found  Next physical within 3 mo: Visit date not found  Prescriber OR PCP: Willian Wright CNP  Last diagnosis associated with med order: 1. Hyperlipidemia, unspecified hyperlipidemia type  - atorvastatin (LIPITOR) 10 MG tablet; Take 1 tablet (10 mg total) by mouth daily.  Dispense: 90 tablet; Refill: 0    If protocol passes may refill for 12 months if within 3 months of last provider visit (or a total of 15 months).

## 2021-06-12 NOTE — TELEPHONE ENCOUNTER
"Pt reports nasal congestion and cough x two days.  New Today -> \"R eye was mattery upon waking.\"  Now \"watery drainage.\"  Eyelids mildly swollen.  Sclera reddened.  Eye feels irritated; no pain.    SECOND ISSUE:  Possible exposure to covid.  Co-worker tested positive.  Pt himself might have been exposed to co-worker at time co-worker became symptomatic  Therefore wishes to request order for covid testing as well as possible Rx antibiotic eye gtts.    Pt agrees to virtual video visit with a provider to request order for covid testing as well as discuss treatment for eye symptoms.  Transferred to a  for an appointment now.    (Also discussed self care measures for cough and eye drainage -> warm fluids w/honey, OTC plain Mucinex, warm moist cottonball treatments q 2 hours as feasible to eye -> pt verbalizes understanding, agrees to plan.)    Mikki Nielson RN  Care Connection Triage     Reason for Disposition    Patient wants to be seen    [1] COVID-19 infection suspected by caller or triager AND [2] mild symptoms (cough, fever, or others) AND [3] no complications or SOB    Additional Information    Negative: Eye exposure to chemical or fumes    Negative: Redness of white of eye (sclera), but no pus or only a small amount of brief pus    Negative: SEVERE pain (e.g., excruciating)    Negative: Patient sounds very sick or weak to the triager    Negative: MODERATE eye pain (e.g., interferes with normal activities)    Negative: Blurred vision    Negative: Cloudy spot or sore seen on the cornea (clear part of the eye)    Negative: Eyelids are very swollen (shut or almost)    Negative: Eyelid (outer) is very red and painful (or tender to touch)    Negative: Fever > 103 F (39.4 C)    Negative: Discharge from penis    Negative: New or abnormal vaginal discharge    Negative: Using antibiotic eyedrops > 3 days but pus persists    Negative: Lots of yellow or green nasal discharge    Negative: Weak immune system (e.g., HIV " positive, cancer chemo, splenectomy, organ transplant, chronic steroids)    Negative: SEVERE difficulty breathing (e.g., struggling for each breath, speaks in single words)    Negative: Difficult to awaken or acting confused (e.g., disoriented, slurred speech)    Negative: Bluish (or gray) lips or face now    Negative: Shock suspected (e.g., cold/pale/clammy skin, too weak to stand, low BP, rapid pulse)    Negative: Sounds like a life-threatening emergency to the triager    Negative: [1] COVID-19 exposure AND [2] no symptoms    Negative: COVID-19 and Breastfeeding, questions about    Negative: [1] Adult with possible COVID-19 symptoms AND [2] triager concerned about severity of symptoms or other causes    Negative: SEVERE or constant chest pain or pressure (Exception: mild central chest pain, present only when coughing)    Negative: MODERATE difficulty breathing (e.g., speaks in phrases, SOB even at rest, pulse 100-120)    Negative: Patient sounds very sick or weak to the triager    Negative: MILD difficulty breathing (e.g., minimal/no SOB at rest, SOB with walking, pulse <100)    Negative: Chest pain or pressure    Negative: Fever > 103 F (39.4 C)    Negative: [1] Fever > 101 F (38.3 C) AND [2] age > 60    Negative: [1] Fever > 100.0 F (37.8 C) AND [2] bedridden (e.g., nursing home patient, CVA, chronic illness, recovering from surgery)    Negative: HIGH RISK patient (e.g., age > 64 years, diabetes, heart or lung disease, weak immune system) (Exception: Has already been evaluated by healthcare provider and has no new or worsening symptoms)    Negative: Fever present > 3 days (72 hours)    Negative: [1] Fever returns after gone for over 24 hours AND [2] symptoms worse or not improved    Negative: [1] Continuous (nonstop) coughing interferes with work or school AND [2] no improvement using cough treatment per protocol    [facility] Specific Disposition - [facility] Specific Patient Instructions  COVID  19 Nurse Triage Plan/Patient Instructions    Please be aware that novel coronavirus (COVID-19) may be circulating in the community. If you develop symptoms such as fever, cough, or SOB or if you have concerns about the presence of another infection including coronavirus (COVID-19), please contact your health care provider or visit [email or web address].       Virtual Visit with provider recommended. Reference Visit Selection Guide.    Thank you for taking steps to prevent the spread of this virus.  Limit your contact with others.  Wear a simple mask to cover your cough.  Wash your hands well and often.    Resources  [facility]: About COVID-19: [email or web address]  CDC: What to Do If You're Sick: www.cdc.gov/coronavirus/2019-ncov/about/steps-when-sick.html  CDC: Ending Home Isolation: www.cdc.gov/coronavirus/2019-ncov/hcp/disposition-in-home-patients.html   CDC: Caring for Someone: www.cdc.gov/coronavirus/2019-ncov/if-you-are-sick/care-for-someone.html   [facility]: Interim Guidance for Hospital Discharge to Home: www.[email or web address]/diseases/coronavirus/hcp/hospdischarge.pdf  [facility] clinical trials (COVID-19 research studies): [email or web address].[email or web address]-clinical-trials   Below are the COVID-19 hotlines at the [place] Department of [facility] ([facility]). Interpreters are available.   For health questions: Call 134-180-5069 or 1-603.400.2337 (7 a.m. to 7 p.m.)  For questions about schools and childcare: Call 009-839-5898 or 1-973.559.3878 (7 a.m. to 7 p.m.)    Protocols used: EYE - PUS OR [id number], CORONAVIRUS (COVID-19) DIAGNOSED OR [id number] 8.4.20

## 2021-06-12 NOTE — PROGRESS NOTES
"Randal Barajas is a 54 y.o. male who is being evaluated via a billable video visit.      The patient has been notified of following:     \"This video visit will be conducted via a call between you and your physician/provider. We have found that certain health care needs can be provided without the need for an in-person physical exam.  This service lets us provide the care you need with a video conversation.  If a prescription is necessary we can send it directly to your pharmacy.  If lab work is needed we can place an order for that and you can then stop by our lab to have the test done at a later time.    Video visits are billed at different rates depending on your insurance coverage. Please reach out to your insurance provider with any questions.    If during the course of the call the physician/provider feels a video visit is not appropriate, you will not be charged for this service.\"    Patient has given verbal consent to a Video visit? Yes  How would you like to obtain your AVS? AVS Preference: MyChart.  If dropped by the video visit, the video invitation should be sent to: Text to cell phone: [phone]  Will anyone else be joining your video visit? No        Video Start Time: 10:01 AM    SUBJECTIVE: Randal Barajas is a 54 y.o. White or  male who presents today by video with a complaint of a dry cough that is ticklish in the back of his throat.  He has had congestion for the last 2 days.  He particularly says he feels it in his right lung and it makes him want to gag when he coughed so hard.  Yesterday he was with his grandson who mentioned that his eye looked irritated.  This morning he woke with his right eye mattered shut.  He notes that there is a copious yellow discharge and although he really washes it away,soon it will come right back.  His eyeball is reddened.  His eyelids are a bit swollen and the eyeball burns.  He says he cannot even see out of it well.  He denies fever and sore throat.  " He denies stomach issues or rash.  He tells me that he knows 2 people that ended up being Covid positive that he has had contact with.  One is a coworker that he worked with all last week.  Yesterday that coworker's wife who was sick tested positive.  The coworker has not been sick and has not been tested yet.  He also has a friend who had Covid and tested positive 9 or 10 days ago.  He had been around him right before he got sick.  His work is wondering if he should stay home.  He usually works Monday through Wednesday.  We discussed he should stay home until he is cleared.  He has been tested in the past 3 or 4 months ago.  At that time he was negative.  We also discussed that his blood pressure was not controlled at his last visit with Willian.  He has not been taking the lisinopril hydrochlorothiazide.  He said he ran out.  I told him that he will need to have an appointment with Willian.    OBJECTIVE: There were no vitals taken for this visit.  General: Mildly ill-appearing middle-aged obese gentleman in no acute physical distress  HEENT: Very noticeable right eye which is swollen and mattery with a yellow discharge and his sclera are reddened.  No noticeable rhinorrhea.  Lungs: Patient is speaking comfortably but does have a nonproductive cough that was heard once or twice during our visit.  Extremities: Patient was moving his upper extremities without problem      ASSESSMENT & PLAN:     1. Dry cough  He needs to be ruled out for COVID-19 in order to go back to work.  He knows how this works.  - Symptomatic COVID-19 Virus (CORONAVIRUS) PCR    2. Acute conjunctivitis of right eye, unspecified acute conjunctivitis type  We discussed that this can be part of the virus issue but because his discharge is so copious and the discharge is yellow, I am going to treat him with an antibiotic eyedrop.  - Symptomatic COVID-19 Virus (CORONAVIRUS) PCR  - ciprofloxacin HCl (CILOXAN) 0.3 % ophthalmic solution; Administer 1 drop,  every 2 hours, while awake, for 2 days. Then 1 drop, every 4 hours, while awake, for the next 5 days.  Dispense: 5 mL; Refill: 0    3. Exposure to COVID-19 virus  Although his coworker is not currently symptomatic, that does not mean he has not spread the virus to those around him.  Needs to be ruled out as he is symptomatic with Covid-like symptoms.  - Symptomatic COVID-19 Virus (CORONAVIRUS) PCR    4.  Hypertension  Patient was not controlled at his last visit.  Possibly not compliant with medications as he said he ran out of his lisinopril.    I will speak to Willian about making a follow-up appointment with this patient and perhaps filling his blood pressure medication if he is supposed to be taking it.    Patient Active Problem List   Diagnosis     History of pulmonary embolism     History of DVT (deep vein thrombosis)     Hypertension with goal blood pressure less than 140/90     Pulmonary nodule     Obesity (BMI 35.0-39.9) with comorbidity (H)     Hyperlipemia       Current Outpatient Medications on File Prior to Visit   Medication Sig Dispense Refill     atorvastatin (LIPITOR) 10 MG tablet Take 1 tablet (10 mg total) by mouth daily. 90 tablet 2     betamethasone dipropionate (DIPROLENE) 0.05 % ointment SILVINA TO LEG RASH BID PRN       metoprolol succinate (TOPROL-XL) 50 MG 24 hr tablet Take 1 tablet (50 mg total) by mouth daily. 90 tablet 1     mupirocin (BACTROBAN) 2 % ointment APPLY TOPICALLYL BID TO LEG RASH       lisinopriL-hydrochlorothiazide (ZESTORETIC) 20-25 mg per tablet Take 1 tablet by mouth daily. (Patient taking differently: Take 1 tablet by mouth daily. Pt is out of medication) 30 tablet 1     LORazepam (ATIVAN) 0.5 MG tablet Take 1 tablet (0.5 mg total) by mouth 3 (three) times a day as needed for anxiety. 15 tablet 0     [DISCONTINUED] meclizine (ANTIVERT) 25 mg tablet Take 1-2 tablets (25-50 mg total) by mouth 4 (four) times a day as needed. 30 tablet 0     No current facility-administered  medications on file prior to visit.        Video-Visit Details    Type of service:  Video Visit    Video End Time (time video stopped): 10:27 AM  Originating Location (pt. Location): Home    Distant Location (provider location):  [facility]     Platform used for Video Visit: Vinny Tripp(Lilian) ESEQUIEL Villegas MD

## 2021-06-12 NOTE — TELEPHONE ENCOUNTER
Refill Request  Did you contact pharmacy: Yes  Medication name:   Requested Prescriptions     Pending Prescriptions Disp Refills     metoprolol succinate (TOPROL-XL) 50 MG 24 hr tablet 90 tablet 1     Sig: Take 1 tablet (50 mg total) by mouth daily.     lisinopriL-hydrochlorothiazide (ZESTORETIC) 20-25 mg per tablet 30 tablet 1     Sig: Take 1 tablet by mouth daily.     Who prescribed the medication: Willian Wright CNP    Requested [facility]acy[facility]  Is patient out of medication: Yes  Patient notified refills processed in 3 business days:  yes  Okay to leave a detailed message: yes

## 2021-06-13 NOTE — TELEPHONE ENCOUNTER
Directions are correct.  The first set of directions are for the first 2 days and the next set of directions are for the next 5 days (after the first 2 days).

## 2021-06-13 NOTE — TELEPHONE ENCOUNTER
Refill Approved    Rx renewed per Medication Renewal Policy. Medication was last renewed on 3/28/20, last OV 11/9/20.    Sis Zapata, [facility] Triage/Med Refill 11/14/2020     Requested Prescriptions   Pending Prescriptions Disp Refills     metoprolol succinate (TOPROL-XL) 50 MG 24 hr tablet 90 tablet 1     Sig: Take 1 tablet (50 mg total) by mouth daily.       Beta-Blockers Refill Protocol Passed - 11/10/2020  8:42 AM        Passed - PCP or prescribing provider visit in past 12 months or next 3 months     Last office visit with prescriber/PCP: 3/28/2019 Willian Wright CNP OR same dept: Visit date not found OR same specialty: 3/28/2019 Willian Wright CNP  Last physical: Visit date not found Last MTM visit: Visit date not found   Next visit within 3 mo: Visit date not found  Next physical within 3 mo: Visit date not found  Prescriber OR PCP: Willian Wright CNP  Last diagnosis associated with med order: 1. Hypertension with goal blood pressure less than 140/90  - metoprolol succinate (TOPROL-XL) 50 MG 24 hr tablet; Take 1 tablet (50 mg total) by mouth daily.  Dispense: 90 tablet; Refill: 1  - lisinopriL-hydrochlorothiazide (ZESTORETIC) 20-25 mg per tablet; Take 1 tablet by mouth daily.  Dispense: 30 tablet; Refill: 1    If protocol passes may refill for 12 months if within 3 months of last provider visit (or a total of 15 months).             Passed - Blood pressure filed in past 12 months     BP Readings from Last 1 Encounters:   05/21/20 (!) 158/120                lisinopriL-hydrochlorothiazide (ZESTORETIC) 20-25 mg per tablet 30 tablet 1     Sig: Take 1 tablet by mouth daily.       Diuretics/Combination Diuretics Refill Protocol  Passed - 11/10/2020  8:42 AM        Passed - Visit with PCP or prescribing provider visit in past 12 months     Last office visit with prescriber/PCP: 3/28/2019 Willian Wright CNP OR same dept: Visit date not found OR same specialty: 3/28/2019 Willian Wright CNP  Last  physical: Visit date not found Last MTM visit: Visit date not found   Next visit within 3 mo: Visit date not found  Next physical within 3 mo: Visit date not found  Prescriber OR PCP: Willian Wright CNP  Last diagnosis associated with med order: 1. Hypertension with goal blood pressure less than 140/90  - metoprolol succinate (TOPROL-XL) 50 MG 24 hr tablet; Take 1 tablet (50 mg total) by mouth daily.  Dispense: 90 tablet; Refill: 1  - lisinopriL-hydrochlorothiazide (ZESTORETIC) 20-25 mg per tablet; Take 1 tablet by mouth daily.  Dispense: 30 tablet; Refill: 1    If protocol passes may refill for 12 months if within 3 months of last provider visit (or a total of 15 months).             Passed - Serum Potassium in past 12 months      Lab Results   Component Value Date    Potassium 4.2 05/21/2020             Passed - Serum Sodium in past 12 months      Lab Results   Component Value Date    Sodium 140 05/21/2020             Passed - Blood pressure on file in past 12 months     BP Readings from Last 1 Encounters:   05/21/20 (!) 158/120             Passed - Serum Creatinine in past 12 months      Creatinine   Date Value Ref Range Status   05/21/2020 1.14 0.70 - 1.30 mg/dL Final

## 2021-06-15 PROBLEM — R91.1 PULMONARY NODULE: Status: ACTIVE | Noted: 2017-02-20

## 2021-06-16 PROBLEM — E78.5 HYPERLIPEMIA: Status: ACTIVE | Noted: 2020-05-22

## 2021-06-16 PROBLEM — Z12.11 ENCOUNTER FOR SCREENING FOR MALIGNANT NEOPLASM OF COLON: Status: ACTIVE | Noted: 2021-01-27

## 2021-06-16 PROBLEM — E66.01 MORBID OBESITY (H): Status: ACTIVE | Noted: 2019-03-28

## 2021-06-17 NOTE — TELEPHONE ENCOUNTER
Refill Approved    Rx renewed per Medication Renewal Policy. Medication was last renewed on 1/27/21.    Andrew Ley, [facility] Triage/Med Refill 4/26/2021     Requested Prescriptions   Pending Prescriptions Disp Refills     losartan-hydrochlorothiazide (HYZAAR) 100-25 mg per tablet [Pharmacy Med Name: LOSARTAN/HCTZ 100/25MG TABLETS] 30 tablet 2     Sig: TAKE 1 TABLET BY MOUTH DAILY       Diuretics/Combination Diuretics Refill Protocol  Passed - 4/25/2021  3:31 AM        Passed - Visit with PCP or prescribing provider visit in past 12 months     Last office visit with prescriber/PCP: 1/27/2021 Peter Hernadez MD OR same dept: 1/27/2021 Peter Hernadez MD OR same specialty: 1/27/2021 Peter Hernadez MD  Last physical: Visit date not found Last MTM visit: Visit date not found   Next visit within 3 mo: Visit date not found  Next physical within 3 mo: Visit date not found  Prescriber OR PCP: Peter Hernadez MD  Last diagnosis associated with med order: 1. Hypertension with goal blood pressure less than 140/90  - losartan-hydrochlorothiazide (HYZAAR) 100-25 mg per tablet [Pharmacy Med Name: LOSARTAN/HCTZ 100/25MG TABLETS]; Take 1 tablet by mouth daily.  Dispense: 30 tablet; Refill: 2    If protocol passes may refill for 12 months if within 3 months of last provider visit (or a total of 15 months).             Passed - Serum Potassium in past 12 months      Lab Results   Component Value Date    Potassium 4.2 05/21/2020             Passed - Serum Sodium in past 12 months      Lab Results   Component Value Date    Sodium 140 05/21/2020             Passed - Blood pressure on file in past 12 months     BP Readings from Last 1 Encounters:   01/27/21 116/84             Passed - Serum Creatinine in past 12 months      Creatinine   Date Value Ref Range Status   05/21/2020 1.14 0.70 - 1.30 mg/dL Final

## 2021-06-19 NOTE — LETTER
Letter by Willian Wright CNP at      Author: Willian Wright CNP Service: -- Author Type: --    Filed:  Encounter Date: 4/2/2019 Status: (Other)         Randal Barajas  [address] [facility] [address]             April 2, 2019         Dear Mr. Barajas,    Below are the results from your recent visit:    Resulted Orders   PSA (Prostatic-Specific Antigen), Annual Screen   Result Value Ref Range    PSA 0.6 0.0 - 3.5 ng/mL    Narrative    Method is Abbott Prostate-Specific Antigen (PSA)  Standard-WHO 1st International (90:10)   Basic Metabolic Panel   Result Value Ref Range    Sodium 141 136 - 145 mmol/L    Potassium 4.0 3.5 - 5.0 mmol/L    Chloride 104 98 - 107 mmol/L    CO2 28 22 - 31 mmol/L    Anion Gap, Calculation 9 5 - 18 mmol/L    Glucose 95 70 - 125 mg/dL    Calcium 9.9 8.5 - 10.5 mg/dL    BUN 22 8 - 22 mg/dL    Creatinine 1.03 0.70 - 1.30 mg/dL    GFR MDRD Af Amer >60 >60 mL/min/1.73m2    GFR MDRD Non Af Amer >60 >60 mL/min/1.73m2    Narrative    Fasting Glucose reference range is 70-99 mg/dL per  American Diabetes Association (ADA) guidelines.   Lipid Cascade RANDOM   Result Value Ref Range    Cholesterol 204 (H) <=199 mg/dL    Triglycerides 196 (H) <=149 mg/dL    HDL Cholesterol 42 >=40 mg/dL    LDL Calculated 123 <=129 mg/dL    Patient Fasting > 8hrs? No        Prostate cancer screening, kidney function, and electrolytes look fine.  Your cholesterol is slightly elevated, but it was also done nonfasting and even then it is not high enough where we would need to do anything about it.     Please call with questions or contact us using [employer].    Sincerely,        Willian Wright CNP

## 2021-06-20 NOTE — LETTER
Letter by Willian Wright CNP at      Author: Willian Wright CNP Service: -- Author Type: --    Filed:  Encounter Date: 5/22/2020 Status: (Other)         Randal Barajas  [address] [facility]  [facility] [address]             May 22, 2020         Dear Mr. Barajas,    Below are the results from your recent visit:    Resulted Orders   Lipid Cascade RANDOM   Result Value Ref Range    Cholesterol 233 (H) <=199 mg/dL    Triglycerides 80 <=149 mg/dL    HDL Cholesterol 60 >=40 mg/dL    LDL Calculated 157 (H) <=129 mg/dL    Patient Fasting > 8hrs? Yes    PSA, Diagnostic (Prostatic-Specific Antigen)   Result Value Ref Range    PSA 0.6 0.0 - 3.5 ng/mL    Narrative    Method is Abbott Prostate-Specific Antigen (PSA)  Standard-WHO 1st International (90:10)   Basic Metabolic Panel   Result Value Ref Range    Sodium 140 136 - 145 mmol/L    Potassium 4.2 3.5 - 5.0 mmol/L    Chloride 101 98 - 107 mmol/L    CO2 26 22 - 31 mmol/L    Anion Gap, Calculation 13 5 - 18 mmol/L    Glucose 91 70 - 125 mg/dL    Calcium 9.4 8.5 - 10.5 mg/dL    BUN 19 8 - 22 mg/dL    Creatinine 1.14 0.70 - 1.30 mg/dL    GFR MDRD Af Amer >60 >60 mL/min/1.73m2    GFR MDRD Non Af Amer >60 >60 mL/min/1.73m2    Narrative    Fasting Glucose reference range is 70-99 mg/dL per  American Diabetes Association (ADA) guidelines.       Labs show normal kidneys, electrolytes, and prostate cancer screening.     Your cholesterol levels have jumped since last year.  With you high blood pressure and these numbers,  You have an almost 10% risk of heart attack and stroke over the next 10 years.  Because of this, we should start a statin to lower your cholesterol numbers.  I have sent a prescription for atorvastatin to the pharmacy to take every evening.     In 2 months, come back for a recheck of blood work to make sure cholesterol numbers are coming down appropriately.     Willian Wright CNP     Please call with questions or contact us using  [email or web address].    Sincerely,        Electronically signed by Willian Wright CNP

## 2021-07-03 NOTE — ADDENDUM NOTE
Addendum Note by Iraj Wright CNP at 5/22/2020  1:13 PM     Author: Iraj Wright CNP Service: -- Author Type: Nurse Practitioner    Filed: 5/22/2020  1:26 PM Encounter Date: 5/22/2020 Status: Signed    : Iraj Wright CNP (Nurse Practitioner)    Addended by: IRAJ WRIGHT on: 5/22/2020 01:26 PM        Modules accepted: Orders

## 2021-08-01 DIAGNOSIS — I10 HYPERTENSION WITH GOAL BLOOD PRESSURE LESS THAN 140/90: ICD-10-CM

## 2021-08-04 NOTE — TELEPHONE ENCOUNTER
"Routing refill request to provider for review/approval because:  Labs not current:  CR, K+, NA    Last Written Prescription Date:  4/26/21  Last Fill Quantity: 90,  # refills: 0   Last office visit provider:   1/27/21    Requested Prescriptions   Pending Prescriptions Disp Refills     losartan-hydrochlorothiazide (HYZAAR) 100-25 MG tablet [Pharmacy Med Name: LOSARTAN/HCTZ 100/25MG TABLETS] 90 tablet 0     Sig: TAKE 1 TABLET BY MOUTH DAILY       Angiotensin-II Receptors Failed - 8/1/2021  3:30 AM        Failed - Normal serum creatinine on file in past 12 months     Recent Labs   Lab Test 05/21/20  1338   CR 1.14       Ok to refill medication if creatinine is low          Failed - Normal serum potassium on file in past 12 months     Recent Labs   Lab Test 05/21/20  1338   POTASSIUM 4.2                    Passed - Last blood pressure under 140/90 in past 12 months     BP Readings from Last 3 Encounters:   01/27/21 116/84   05/21/20 (!) 158/120                 Passed - Recent (12 mo) or future (30 days) visit within the authorizing provider's specialty     Patient has had an office visit with the authorizing provider or a provider within the authorizing providers department within the previous 12 mos or has a future within next 30 days. See \"Patient Info\" tab in inbasket, or \"Choose Columns\" in Meds & Orders section of the refill encounter.              Passed - Medication is active on med list        Passed - Patient is age 18 or older       Diuretics (Including Combos) Protocol Failed - 8/1/2021  3:30 AM        Failed - Normal serum creatinine on file in past 12 months     Recent Labs   Lab Test 05/21/20  1338   CR 1.14              Failed - Normal serum potassium on file in past 12 months     Recent Labs   Lab Test 05/21/20  1338   POTASSIUM 4.2                    Failed - Normal serum sodium on file in past 12 months     Recent Labs   Lab Test 05/21/20  1338                 Passed - Blood pressure under 140/90 " "in past 12 months     BP Readings from Last 3 Encounters:   01/27/21 116/84   05/21/20 (!) 158/120                 Passed - Recent (12 mo) or future (30 days) visit within the authorizing provider's specialty     Patient has had an office visit with the authorizing provider or a provider within the authorizing providers department within the previous 12 mos or has a future within next 30 days. See \"Patient Info\" tab in inbasket, or \"Choose Columns\" in Meds & Orders section of the refill encounter.              Passed - Medication is active on med list        Passed - Patient is age 18 or older             Haley Deutsch RN 08/04/21 2:12 PM  "

## 2021-08-05 RX ORDER — LOSARTAN POTASSIUM AND HYDROCHLOROTHIAZIDE 25; 100 MG/1; MG/1
TABLET ORAL
Qty: 90 TABLET | Refills: 0 | Status: SHIPPED | OUTPATIENT
Start: 2021-08-05 | End: 2021-11-23

## 2021-08-12 ENCOUNTER — OFFICE VISIT (OUTPATIENT)
Dept: FAMILY MEDICINE | Facility: CLINIC | Age: 56
End: 2021-08-12
Payer: COMMERCIAL

## 2021-08-12 VITALS
SYSTOLIC BLOOD PRESSURE: 158 MMHG | DIASTOLIC BLOOD PRESSURE: 100 MMHG | WEIGHT: 300 LBS | BODY MASS INDEX: 41.84 KG/M2 | OXYGEN SATURATION: 96 % | HEART RATE: 105 BPM

## 2021-08-12 DIAGNOSIS — E78.5 HYPERLIPIDEMIA, UNSPECIFIED HYPERLIPIDEMIA TYPE: ICD-10-CM

## 2021-08-12 DIAGNOSIS — Z12.5 SCREENING FOR PROSTATE CANCER: ICD-10-CM

## 2021-08-12 DIAGNOSIS — R06.81 WITNESSED EPISODE OF APNEA: Primary | ICD-10-CM

## 2021-08-12 DIAGNOSIS — E66.01 MORBID OBESITY (H): ICD-10-CM

## 2021-08-12 DIAGNOSIS — I10 HYPERTENSION WITH GOAL BLOOD PRESSURE LESS THAN 140/90: ICD-10-CM

## 2021-08-12 LAB
ALBUMIN SERPL-MCNC: 3.9 G/DL (ref 3.5–5)
ALP SERPL-CCNC: 130 U/L (ref 45–120)
ALT SERPL W P-5'-P-CCNC: 90 U/L (ref 0–45)
ANION GAP SERPL CALCULATED.3IONS-SCNC: 11 MMOL/L (ref 5–18)
AST SERPL W P-5'-P-CCNC: 43 U/L (ref 0–40)
BILIRUB SERPL-MCNC: 0.8 MG/DL (ref 0–1)
BUN SERPL-MCNC: 24 MG/DL (ref 8–22)
CALCIUM SERPL-MCNC: 10 MG/DL (ref 8.5–10.5)
CHLORIDE BLD-SCNC: 102 MMOL/L (ref 98–107)
CHOLEST SERPL-MCNC: 218 MG/DL
CO2 SERPL-SCNC: 27 MMOL/L (ref 22–31)
CREAT SERPL-MCNC: 1.13 MG/DL (ref 0.7–1.3)
FASTING STATUS PATIENT QL REPORTED: NO
GFR SERPL CREATININE-BSD FRML MDRD: 72 ML/MIN/1.73M2
GLUCOSE BLD-MCNC: 102 MG/DL (ref 70–125)
HDLC SERPL-MCNC: 49 MG/DL
LDLC SERPL CALC-MCNC: 144 MG/DL
POTASSIUM BLD-SCNC: 4.6 MMOL/L (ref 3.5–5)
PROT SERPL-MCNC: 7.5 G/DL (ref 6–8)
PSA SERPL-MCNC: 0.62 UG/L (ref 0–3.5)
SODIUM SERPL-SCNC: 140 MMOL/L (ref 136–145)
TRIGL SERPL-MCNC: 127 MG/DL

## 2021-08-12 PROCEDURE — 80053 COMPREHEN METABOLIC PANEL: CPT | Performed by: NURSE PRACTITIONER

## 2021-08-12 PROCEDURE — G0103 PSA SCREENING: HCPCS | Performed by: NURSE PRACTITIONER

## 2021-08-12 PROCEDURE — 80061 LIPID PANEL: CPT | Performed by: NURSE PRACTITIONER

## 2021-08-12 PROCEDURE — 36415 COLL VENOUS BLD VENIPUNCTURE: CPT | Performed by: NURSE PRACTITIONER

## 2021-08-12 PROCEDURE — 99214 OFFICE O/P EST MOD 30 MIN: CPT | Performed by: NURSE PRACTITIONER

## 2021-08-12 RX ORDER — ROSUVASTATIN CALCIUM 10 MG/1
10 TABLET, COATED ORAL DAILY
Qty: 90 TABLET | Refills: 0 | Status: SHIPPED | OUTPATIENT
Start: 2021-08-12 | End: 2021-11-23

## 2021-08-12 RX ORDER — METOPROLOL SUCCINATE 100 MG/1
100 TABLET, EXTENDED RELEASE ORAL DAILY
Qty: 90 TABLET | Refills: 0 | Status: SHIPPED | OUTPATIENT
Start: 2021-08-12 | End: 2021-11-23

## 2021-08-12 NOTE — PROGRESS NOTES
Chief Complaint   Patient presents with     Sleep Apnea     Needs sleep study and possible cpap     Oral Swelling     Bites his tongue a lot at nightime and getting tongue tied while trying to talk      Insomnia     Falling asleep everywhere and drooling        HPI: Patient presents today with sleeping problems.  He has difficulty staying awake during the day because his sleep quality is so poor.  He has been told that he snores excessively and has had witnessed apnea spells.  This has been a longstanding issue now for years.  This is in the setting of morbid obesity.  He finds that sometimes he will grind his teeth at night and has bitten his tongue as well.  He gets to the bathroom 3 times during the night, but has no issues initiating a stream.  He does not restrict fluids in the evening.    Blood pressure today is uncontrolled.  Continues with losartan hydrochlorothiazide and metoprolol 50 mg.  No side effects from either of these.      ROS:Review of Systems - negative except for what's listed in the HPI    SH: The Patient's  reports that he has never smoked. He has never used smokeless tobacco.      FH: The Patient's family history is not on file.     Meds:    Current Outpatient Medications   Medication     betamethasone dipropionate (DIPROLENE) 0.05 % ointment     LORazepam (ATIVAN) 0.5 MG tablet     losartan-hydrochlorothiazide (HYZAAR) 100-25 MG tablet     metoprolol succinate ER (TOPROL-XL) 100 MG 24 hr tablet     rosuvastatin (CRESTOR) 10 MG tablet     tadalafiL (CIALIS) 20 MG tablet     No current facility-administered medications for this visit.       O:  BP (!) 158/100   Pulse 105   Wt 136.1 kg (300 lb)   SpO2 96%   BMI 41.84 kg/m      Physical Examination:   General appearance - alert, well appearing, and in no distress  Mental status - alert, oriented to person, place, and time  Eyes -PERRLA  Mouth - mucous membranes moist. No oral lesions.  Tongue midline  Neck - no significant  adenopathy  Lymphatics - no palpable lymphadenopathy  Chest - clear to auscultation, no wheezes, rales or rhonchi, symmetric air entry  Heart - normal rate and regular rhythm, S1 and S2 normal, no murmurs noted  Abdomen - soft, nontender, nondistended, no masses or organomegaly  Neurological - alert, oriented, normal speech, no focal findings or movement disorder noted, neck supple without rigidity, cranial nerves II through XII intact, motor and sensory grossly normal bilaterally, normal muscle tone, no tremors, strength 5/5  Extremities - peripheral pulses normal, no peripheral edema  Skin - normal coloration and turgor.      A/P:       ICD-10-CM    1. Witnessed episode of apnea  R06.81 REVIEW OF HEALTH MAINTENANCE PROTOCOL ORDERS     SLEEP EVALUATION & MANAGEMENT REFERRAL - ADULT -   2. Hyperlipidemia, unspecified hyperlipidemia type  E78.5 rosuvastatin (CRESTOR) 10 MG tablet     Comprehensive metabolic panel (BMP + Alb, Alk Phos, ALT, AST, Total. Bili, TP)     Lipid panel     Comprehensive metabolic panel (BMP + Alb, Alk Phos, ALT, AST, Total. Bili, TP)     Lipid panel   3. Hypertension with goal blood pressure less than 140/90  I10 metoprolol succinate ER (TOPROL-XL) 100 MG 24 hr tablet     Comprehensive metabolic panel (BMP + Alb, Alk Phos, ALT, AST, Total. Bili, TP)     Comprehensive metabolic panel (BMP + Alb, Alk Phos, ALT, AST, Total. Bili, TP)   4. Screening for prostate cancer  Z12.5 PSA, screen     PSA, screen       Patient has struggled with atorvastatin causing side effects.  Try rosuvastatin in place.  Update labs.  Increase metoprolol to 100 mg for uncontrolled hypertension.  Discussed with the patient risks of morbid obesity, uncontrolled blood pressure, and uncontrolled probable sleep apnea.  He is ready to get these under control.  Referral to sleep medicine placed.  Check back in a month for nurse blood pressure visit.  Discussed the opportunity to meet with the bariatric clinic.  He is going to  think about this.    Witnessed episode of apnea  - REVIEW OF HEALTH MAINTENANCE PROTOCOL ORDERS  - SLEEP EVALUATION & MANAGEMENT REFERRAL - ADULT -    Hyperlipidemia, unspecified hyperlipidemia type  - rosuvastatin (CRESTOR) 10 MG tablet  Dispense: 90 tablet; Refill: 0  - Comprehensive metabolic panel (BMP + Alb, Alk Phos, ALT, AST, Total. Bili, TP)  - Lipid panel  - Comprehensive metabolic panel (BMP + Alb, Alk Phos, ALT, AST, Total. Bili, TP)  - Lipid panel    Hypertension with goal blood pressure less than 140/90  - metoprolol succinate ER (TOPROL-XL) 100 MG 24 hr tablet  Dispense: 90 tablet; Refill: 0  - Comprehensive metabolic panel (BMP + Alb, Alk Phos, ALT, AST, Total. Bili, TP)  - Comprehensive metabolic panel (BMP + Alb, Alk Phos, ALT, AST, Total. Bili, TP)    Morbid obesity    Screening for prostate cancer  - PSA, screen  - PSA, screen        Willian Wright, CNP      This note has been dictated using voice recognition software. Any grammatical or context distortions are unintentional and inherent to the software.

## 2021-08-12 NOTE — PATIENT INSTRUCTIONS
Let us try to protect your cardiovascular system.    I went ahead and ordered a referral to sleep medicine. You should be getting a call in the next few business days.    I also sent in a higher dose of metoprolol to your pharmacy to try to keep bringing the blood pressure down. Check blood pressure again in a month to make sure things are improving.    Since you had itching with atorvastatin, let us try Crestor instead to see if you can tolerate that okay.    Updating labs today.    If you ever want to meet with the weight loss clinic, please let me know.

## 2021-08-12 NOTE — LETTER
August 15, 2021      Randal Barajas  [address] [place] [address]        Dear ,    We are writing to inform you of your test results.      Resulted Orders   Comprehensive metabolic panel (BMP + Alb, Alk Phos, ALT, AST, Total. Bili, TP)   Result Value Ref Range    Sodium 140 136 - 145 mmol/L    Potassium 4.6 3.5 - 5.0 mmol/L    Chloride 102 98 - 107 mmol/L    Carbon Dioxide (CO2) 27 22 - 31 mmol/L    Anion Gap 11 5 - 18 mmol/L    Urea Nitrogen 24 (H) 8 - 22 mg/dL    Creatinine 1.13 0.70 - 1.30 mg/dL    Calcium 10.0 8.5 - 10.5 mg/dL    Glucose 102 70 - 125 mg/dL    Alkaline Phosphatase 130 (H) 45 - 120 U/L    AST 43 (H) 0 - 40 U/L    ALT 90 (H) 0 - 45 U/L    Protein Total 7.5 6.0 - 8.0 g/dL    Albumin 3.9 3.5 - 5.0 g/dL    Bilirubin Total 0.8 0.0 - 1.0 mg/dL    GFR Estimate 72 >60 mL/min/1.73m2      Comment:      As of July 11, 2021, eGFR is calculated by the CKD-EPI creatinine equation, without race adjustment. eGFR can be influenced by muscle mass, exercise, and diet. The reported eGFR is an estimation only and is only applicable if the renal function is stable.   PSA, screen   Result Value Ref Range    Prostate Specific Antigen Screen 0.62 0.00 - 3.50 ug/L   Lipid panel   Result Value Ref Range    Cholesterol 218 (H) <=199 mg/dL    Triglycerides 127 <=149 mg/dL    Direct Measure HDL 49 >=40 mg/dL      Comment:      HDL Cholesterol Reference Range:     0-2 years:   No reference ranges established for patients under 2 years old  at [employer] for lipid analytes.    2-8 years:  Greater than 45 mg/dL     18 years and older:   Female: Greater than or equal to 50 mg/dL   Male:   Greater than or equal to 40 mg/dL    LDL Cholesterol Calculated 144 (H) <=129 mg/dL    Patient Fasting > 8hrs? No        Cholesterol levels are still on the high side.  Hopefully you will tolerate the new Crestor medication.  Prostate cancer screening looks fine.  Kidneys and electrolytes are okay, but your  liver enzymes are a little bit elevated.  Nothing worrisome right now.  This is likely a side effect of carrying too much weight.  When we next meet in 6 months, we can recheck this.    If you have any questions or concerns, please call the clinic at the number listed above.       Sincerely,      Willian Wright NP

## 2021-08-28 ENCOUNTER — HEALTH MAINTENANCE LETTER (OUTPATIENT)
Age: 56
End: 2021-08-28

## 2021-10-05 VITALS — BODY MASS INDEX: 40.63 KG/M2 | HEIGHT: 72 IN | WEIGHT: 300 LBS

## 2021-10-05 ASSESSMENT — MIFFLIN-ST. JEOR: SCORE: 2228.79

## 2021-10-05 NOTE — PATIENT INSTRUCTIONS
Your BMI is Body mass index is 40.69 kg/m .  Weight management is a personal decision.  If you are interested in exploring weight loss strategies, the following discussion covers the approaches that may be successful. Body mass index (BMI) is one way to tell whether you are at a healthy weight, overweight, or obese. It measures your weight in relation to your height.  A BMI of 18.5 to 24.9 is in the healthy range. A person with a BMI of 25 to 29.9 is considered overweight, and someone with a BMI of 30 or greater is considered obese. More than two-thirds of American adults are considered overweight or obese.  Being overweight or obese increases the risk for further weight gain. Excess weight may lead to heart disease and diabetes.  Creating and following plans for healthy eating and physical activity may help you improve your health.  Weight control is part of healthy lifestyle and includes exercise, emotional health, and healthy eating habits. Careful eating habits lifelong are the mainstay of weight control. Though there are significant health benefits from weight loss, long-term weight loss with diet alone may be very difficult to achieve- studies show long-term success with dietary management in less than 10% of people. Attaining a healthy weight may be especially difficult to achieve in those with severe obesity. In some cases, medications, devices and surgical management might be considered.  What can you do?  If you are overweight or obese and are interested in methods for weight loss, you should discuss this with your provider.     Consider reducing daily calorie intake by 500 calories.     Keep a food journal.     Avoiding skipping meals, consider cutting portions instead.    Diet combined with exercise helps maintain muscle while optimizing fat loss. Strength training is particularly important for building and maintaining muscle mass. Exercise helps reduce stress, increase energy, and improves fitness.  Increasing exercise without diet control, however, may not burn enough calories to loose weight.       Start walking three days a week 10-20 minutes at a time    Work towards walking thirty minutes five days a week     Eventually, increase the speed of your walking for 1-2 minutes at time    In addition, we recommend that you review healthy lifestyles and methods for weight loss available through the National Institutes of Health patient information sites:  http://win.niddk.nih.gov/publications/index.htm    And look into health and wellness programs that may be available through your health insurance provider, employer, local community center, or amanda club.    Weight management plan: Patient was referred to their PCP to discuss a diet and exercise plan.

## 2021-10-05 NOTE — PROGRESS NOTES
"Eh is a 55 year old who is being evaluated via a billable video visit.      How would you like to obtain your AVS? MyChart  If the video visit is dropped, the invitation should be resent by: Text to cell phone: [phone]  Will anyone else be joining your video visit? No  {If patient encounters technical issues they should call [phone] :[id number]}  Devika Encarnacion [facility]  Video Start Time: {video visit start/end time for provider to select:152948}  Video-Visit Details    Type of service:  Video Visit    Video End Time:{video visit start/end time for provider to select:152948}    Originating Location (pt. Location): {video visit patient location:786118::\"Home\"}    Distant Location (provider location):  [facility]     Platform used for Video Visit: {Virtual Visit Platforms:885799::\"SeeMe\"}  "

## 2021-10-06 NOTE — PROGRESS NOTES
"Randal Barajas is a 55 year old male who is being evaluated via a billable video visit.       The patient has been notified of following:      \"This video visit will be conducted via a call between you and your physician/provider. We have found that certain health care needs can be provided without the need for an in-person physical exam.  This service lets us provide the care you need with a video conversation.  If a prescription is necessary we can send it directly to your pharmacy.  If lab work is needed we can place an order for that and you can then stop by our lab to have the test done at a later time.     Video visits are billed at different rates depending on your insurance coverage.  Please reach out to your insurance provider with any questions.     If during the course of the call the physician/provider feels a video visit is not appropriate, you will not be charged for this service.\"     Patient has given verbal consent for Video visit? Yes  How would you like to obtain your AVS? Mail a copy  If you are dropped from the video visit, the video invite should be resent to: Text to cell phone: -  Will anyone else be joining your video visit? No  If patient encounters technical issues they should call [phone]      Video-Visit Details     Type of service:  Video Visit     Video Start Time: 8:30am  Video End Time: 9am    Originating Location (pt. Location): Home     Distant Location (provider location):  [facility]      Platform used for Video Visit: Locally    Virtual visit for increased pre-test probability for sleep-disordered breathing.     Assessment:  - Increased pre-test probability of CHUN with STOPBANG score of 7+.    Plan:  - Plan to proceed with in-lab PSG.    SUBJECTIVE:  Randal Barajas is a 55 year old year old male.    Pertinent PMHx of obesity, HLD, HTN.    Noted on visit 8/12/2021 with Willian Wright NP for hypersomnia, snoring, observed apnea, bruxism.    Today " - Due to technical issue, prior documentation was lost.    BRO Total Score: 21    Past medical history:    Patient Active Problem List    Diagnosis Date Noted     Encounter for screening for malignant neoplasm of colon 01/27/2021     Priority: Medium     Hyperlipemia 05/22/2020     Priority: Medium     Obesity (BMI 35.0-39.9) with comorbidity (H) 03/28/2019     Priority: Medium     Pulmonary nodule 02/20/2017     Priority: Medium     Identified 11/30/2015 on CTA--needs one year follow-up         Prepatellar bursitis of left knee 08/20/2016     Priority: Medium     History of DVT (deep vein thrombosis) 12/01/2015     Priority: Medium     Replacement Utility updated for latest IMO load         Hypertension with goal blood pressure less than 140/90 12/01/2015     Priority: Medium     BP Readings from Last 1 Encounters:   11/02/16 (!) 144/102          History of pulmonary embolism 11/30/2015     Priority: Medium       10 point ROS of systems including Constitutional, Eyes, Respiratory, Cardiovascular, Gastroenterology, Genitourinary, Integumentary, Muscularskeletal, Psychiatric were all negative except for pertinent positives noted in my HPI.    Current Outpatient Medications   Medication Sig Dispense Refill     losartan-hydrochlorothiazide (HYZAAR) 100-25 MG tablet TAKE 1 TABLET BY MOUTH DAILY 90 tablet 0     rosuvastatin (CRESTOR) 10 MG tablet Take 1 tablet (10 mg) by mouth daily 90 tablet 0     betamethasone dipropionate (DIPROLENE) 0.05 % ointment [BETAMETHASONE DIPROPIONATE (DIPROLENE) 0.05 % OINTMENT] APPLY TO LEG RASH BID PRN 30 g 0     LORazepam (ATIVAN) 0.5 MG tablet [LORAZEPAM (ATIVAN) 0.5 MG TABLET] Take 1 tablet (0.5 mg total) by mouth 3 (three) times a day as needed for anxiety. (Patient not taking: Reported on 10/5/2021) 15 tablet 0     metoprolol succinate ER (TOPROL-XL) 100 MG 24 hr tablet Take 1 tablet (100 mg) by mouth daily (Patient not taking: Reported on 10/5/2021) 90 tablet 0     tadalafiL (CIALIS)  20 MG tablet [TADALAFIL (CIALIS) 20 MG TABLET] Take 1 tablet (20 mg total) by mouth daily as needed for erectile dysfunction. (Patient not taking: Reported on 10/5/2021) 30 tablet PRN       OBJECTIVE:  Ht 1.829 m (6')   Wt 136.1 kg (300 lb)   BMI 40.69 kg/m      Physical Exam     ---  This note was written with the assistance of the Dragon voice-dictation technology software. The final document, although reviewed, may contain errors. For corrections, please contact the office.    Shin Sousa MD    Sleep Medicine  [facility]  ([phone])  [facility]  ([phone])

## 2021-10-07 ENCOUNTER — VIRTUAL VISIT (OUTPATIENT)
Dept: SLEEP MEDICINE | Facility: CLINIC | Age: 56
End: 2021-10-07
Payer: COMMERCIAL

## 2021-10-07 DIAGNOSIS — G47.10 HYPERSOMNIA: ICD-10-CM

## 2021-10-07 DIAGNOSIS — G47.50 PARASOMNIA, UNSPECIFIED TYPE: ICD-10-CM

## 2021-10-07 DIAGNOSIS — R06.83 SNORING: Primary | ICD-10-CM

## 2021-10-07 DIAGNOSIS — R06.81 WITNESSED EPISODE OF APNEA: ICD-10-CM

## 2021-10-07 DIAGNOSIS — F45.8 BRUXISM: ICD-10-CM

## 2021-10-07 PROCEDURE — 99203 OFFICE O/P NEW LOW 30 MIN: CPT | Mod: 95 | Performed by: FAMILY MEDICINE

## 2021-10-07 NOTE — PROGRESS NOTES
"Randal Barajas is a 55 year old male who is being evaluated via a billable video visit.       The patient has been notified of following:      \"This video visit will be conducted via a call between you and your physician/provider. We have found that certain health care needs can be provided without the need for an in-person physical exam.  This service lets us provide the care you need with a video conversation.  If a prescription is necessary we can send it directly to your pharmacy.  If lab work is needed we can place an order for that and you can then stop by our lab to have the test done at a later time.     Video visits are billed at different rates depending on your insurance coverage.  Please reach out to your insurance provider with any questions.     If during the course of the call the physician/provider feels a video visit is not appropriate, you will not be charged for this service.\"     Patient has given verbal consent for Video visit? Yes  How would you like to obtain your AVS? Mail a copy  If you are dropped from the video visit, the video invite should be resent to: Text to cell phone: -  Will anyone else be joining your video visit? No  If patient encounters technical issues they should call [phone]     Video-Visit Details     Type of service:  Video Visit     Video Start Time: 8:30  Video End Time: 8:55    Originating Location (pt. Location): Home     Distant Location (provider location):  [facility]      Platform used for Video Visit: Elepago     Virtual visit for likely CHUN, bruxism, and possible dream enactment behavior.     Assessment:  Probable obstructive sleep apnea with STOP BANG score of 7-8 with unknown neck circumference     Plan:  - Classic PSG testing ordered    SUBJECTIVE:  Randal Barajas is a 55 year old year old male.     Pertinent PMHx of obesity, HLD, HTN, obesity.     Noted on visit 8/12/2021 with Willian Wright NP for hypersomnia, snoring, " observed apnea, bruxism.     Today - Eh presents today with multiple sleep related concerns. He reports that he has snored in his sleep for many years, however that this has worsened over the last year and a half. He does note that he has gained a significant amount of weight in a similar time frame as well. The patient's wife has told him that he stops breathing multiple times throughout the night as well. Along with his snoring he has also experienced significant daytime sleepiness, to the point that his boss at work has had a conversation with him about this. He also notes frequent AM headaches. Sleep is further complicated by reported fragmentation, with Eh noting that he typically wakes up once or twice during the night to use the bathroom.    Along with the above Eh also notes that he has grinded his teeth during sleep since childhood, however this too has worsened in approximately the last 6 months. This is frequent and severe enough now that he frequently he frequently bites his tongue to the point that it is swollen the next morning which concerns him.     Finally, Eh reports a history of sleepwalking which began as a chid. He reports that his wife no longer sleeps in bed with him due to his frequent jerking arm and leg movements. Eh has fallen out of bed several times due to this, with one previous incident resulting in a gash over his left eye. He reports that he will often wake up kneeling next to the bed in the morning. There has not been any associated incontinence of the bowel or bladder with this.    BRO Total Score: 21     Past medical history:    Patient Active Problem List     Diagnosis Date Noted     Encounter for screening for malignant neoplasm of colon 01/27/2021       Priority: Medium     Hyperlipemia 05/22/2020       Priority: Medium     Obesity (BMI 35.0-39.9) with comorbidity (H) 03/28/2019       Priority: Medium     Pulmonary nodule 02/20/2017       Priority: Medium       Identified  11/30/2015 on CTA--needs one year follow-up           Prepatellar bursitis of left knee 08/20/2016       Priority: Medium     History of DVT (deep vein thrombosis) 12/01/2015       Priority: Medium       Replacement Utility updated for latest IMO load           Hypertension with goal blood pressure less than 140/90 12/01/2015       Priority: Medium       BP Readings from Last 1 Encounters:   11/02/16 (!) 144/102            History of pulmonary embolism 11/30/2015       Priority: Medium         10 point ROS of systems including Constitutional, Eyes, Respiratory, Cardiovascular, Gastroenterology, Genitourinary, Integumentary, Muscularskeletal, Psychiatric were all negative except for pertinent positives noted in my HPI.    Current Outpatient Prescriptions          Current Outpatient Medications   Medication Sig Dispense Refill     losartan-hydrochlorothiazide (HYZAAR) 100-25 MG tablet TAKE 1 TABLET BY MOUTH DAILY 90 tablet 0     rosuvastatin (CRESTOR) 10 MG tablet Take 1 tablet (10 mg) by mouth daily 90 tablet 0     betamethasone dipropionate (DIPROLENE) 0.05 % ointment [BETAMETHASONE DIPROPIONATE (DIPROLENE) 0.05 % OINTMENT] APPLY TO LEG RASH BID PRN 30 g 0     LORazepam (ATIVAN) 0.5 MG tablet [LORAZEPAM (ATIVAN) 0.5 MG TABLET] Take 1 tablet (0.5 mg total) by mouth 3 (three) times a day as needed for anxiety. (Patient not taking: Reported on 10/5/2021) 15 tablet 0     metoprolol succinate ER (TOPROL-XL) 100 MG 24 hr tablet Take 1 tablet (100 mg) by mouth daily (Patient not taking: Reported on 10/5/2021) 90 tablet 0     tadalafiL (CIALIS) 20 MG tablet [TADALAFIL (CIALIS) 20 MG TABLET] Take 1 tablet (20 mg total) by mouth daily as needed for erectile dysfunction. (Patient not taking: Reported on 10/5/2021) 30 tablet PRN            OBJECTIVE:  Ht 1.829 m (6')   Wt 136.1 kg (300 lb)   BMI 40.69 kg/m       Physical Exam      ---  This note was written with the assistance of the Dragon voice-dictation technology  software. The final document, although reviewed, may contain errors. For corrections, please contact the office.     Shin Sousa MD     Sleep Medicine  [facility]  ([phone])  [facility]  ([phone])

## 2021-10-15 ENCOUNTER — TELEPHONE (OUTPATIENT)
Dept: SLEEP MEDICINE | Facility: CLINIC | Age: 56
End: 2021-10-15

## 2021-10-15 NOTE — TELEPHONE ENCOUNTER
Reason for call:  Other   Patient called regarding (reason for call): appointment  Additional comments: Patient is requesting a callback to schedule his overnight in clinic study. He has an order in     Phone number to reach patient:  Cell number on file:    Telephone Information:   Mobile [phone]       Best Time:  any    Can we leave a detailed message on this number?  YES    Travel screening: Negative

## 2021-10-23 ENCOUNTER — HEALTH MAINTENANCE LETTER (OUTPATIENT)
Age: 56
End: 2021-10-23

## 2021-11-21 DIAGNOSIS — I10 HYPERTENSION WITH GOAL BLOOD PRESSURE LESS THAN 140/90: ICD-10-CM

## 2021-11-21 DIAGNOSIS — E78.5 HYPERLIPIDEMIA, UNSPECIFIED HYPERLIPIDEMIA TYPE: ICD-10-CM

## 2021-11-21 RX ORDER — ROSUVASTATIN CALCIUM 10 MG/1
TABLET, COATED ORAL
Qty: 90 TABLET | Refills: 0 | Status: CANCELLED | OUTPATIENT
Start: 2021-11-21

## 2021-11-21 RX ORDER — LOSARTAN POTASSIUM AND HYDROCHLOROTHIAZIDE 25; 100 MG/1; MG/1
TABLET ORAL
Qty: 90 TABLET | Refills: 0 | Status: CANCELLED | OUTPATIENT
Start: 2021-11-21

## 2021-11-23 RX ORDER — LOSARTAN POTASSIUM AND HYDROCHLOROTHIAZIDE 25; 100 MG/1; MG/1
TABLET ORAL
Qty: 90 TABLET | Refills: 0 | Status: SHIPPED | OUTPATIENT
Start: 2021-11-23 | End: 2022-02-21

## 2021-11-23 RX ORDER — ROSUVASTATIN CALCIUM 10 MG/1
10 TABLET, COATED ORAL DAILY
Qty: 90 TABLET | Refills: 2 | Status: SHIPPED | OUTPATIENT
Start: 2021-11-23 | End: 2022-08-17

## 2021-11-23 RX ORDER — METOPROLOL SUCCINATE 100 MG/1
TABLET, EXTENDED RELEASE ORAL
Qty: 90 TABLET | Refills: 0 | Status: SHIPPED | OUTPATIENT
Start: 2021-11-23 | End: 2022-02-21

## 2021-11-23 NOTE — TELEPHONE ENCOUNTER
Refill sent.  Please call patient.  After his appointment in August I wanted him to come back after increasing his metoprolol to make sure that blood pressure was under control.  Please see about scheduling a nurse only visit.    Willian Wright NP

## 2021-11-23 NOTE — TELEPHONE ENCOUNTER
"Routing refill request to provider for review/approval because:  BP not in range.    Last Written Prescription Date:  8/5/21  Last Fill Quantity: 90,  # refills: 0   Last office visit provider:  8/12/21    Last Written Prescription Date:  8/12/21  Last Fill Quantity: 90,  # refills: 0   Last office visit provider:  8/12/21     Requested Prescriptions   Pending Prescriptions Disp Refills     losartan-hydrochlorothiazide (HYZAAR) 100-25 MG tablet [Pharmacy Med Name: LOSARTAN/HCTZ 100/25MG TABLETS] 90 tablet 0     Sig: TAKE 1 TABLET BY MOUTH DAILY       Angiotensin-II Receptors Failed - 11/21/2021  2:15 PM        Failed - Last blood pressure under 140/90 in past 12 months     BP Readings from Last 3 Encounters:   08/12/21 (!) 158/100   01/27/21 116/84   05/21/20 (!) 158/120                 Passed - Recent (12 mo) or future (30 days) visit within the authorizing provider's specialty     Patient has had an office visit with the authorizing provider or a provider within the authorizing providers department within the previous 12 mos or has a future within next 30 days. See \"Patient Info\" tab in inbasket, or \"Choose Columns\" in Meds & Orders section of the refill encounter.              Passed - Medication is active on med list        Passed - Patient is age 18 or older        Passed - Normal serum creatinine on file in past 12 months     Recent Labs   Lab Test 08/12/21  1515   CR 1.13       Ok to refill medication if creatinine is low          Passed - Normal serum potassium on file in past 12 months     Recent Labs   Lab Test 08/12/21  1515   POTASSIUM 4.6                   Diuretics (Including Combos) Protocol Failed - 11/21/2021  2:15 PM        Failed - Blood pressure under 140/90 in past 12 months     BP Readings from Last 3 Encounters:   08/12/21 (!) 158/100   01/27/21 116/84   05/21/20 (!) 158/120                 Passed - Recent (12 mo) or future (30 days) visit within the authorizing provider's specialty     " "Patient has had an office visit with the authorizing provider or a provider within the authorizing providers department within the previous 12 mos or has a future within next 30 days. See \"Patient Info\" tab in inbasket, or \"Choose Columns\" in Meds & Orders section of the refill encounter.              Passed - Medication is active on med list        Passed - Patient is age 18 or older        Passed - Normal serum creatinine on file in past 12 months     Recent Labs   Lab Test 08/12/21  1515   CR 1.13              Passed - Normal serum potassium on file in past 12 months     Recent Labs   Lab Test 08/12/21  1515   POTASSIUM 4.6                    Passed - Normal serum sodium on file in past 12 months     Recent Labs   Lab Test 08/12/21  1515                    metoprolol succinate ER (TOPROL-XL) 100 MG 24 hr tablet [Pharmacy Med Name: METOPROLOL ER SUCCINATE 100MG TABS] 90 tablet 0     Sig: TAKE 1 TABLET(100 MG) BY MOUTH DAILY       Beta-Blockers Protocol Failed - 11/21/2021  2:15 PM        Failed - Blood pressure under 140/90 in past 12 months     BP Readings from Last 3 Encounters:   08/12/21 (!) 158/100   01/27/21 116/84   05/21/20 (!) 158/120                 Passed - Patient is age 6 or older        Passed - Recent (12 mo) or future (30 days) visit within the authorizing provider's specialty     Patient has had an office visit with the authorizing provider or a provider within the authorizing providers department within the previous 12 mos or has a future within next 30 days. See \"Patient Info\" tab in [employer]sket, or \"Choose Columns\" in Meds & Orders section of the refill encounter.              Passed - Medication is active on med list         Signed Prescriptions Disp Refills    rosuvastatin (CRESTOR) 10 MG tablet 90 tablet 2     Sig: Take 1 tablet (10 mg) by mouth daily       Statins Protocol Passed - 11/21/2021  2:15 PM        Passed - LDL on file in past 12 months     Recent Labs   Lab Test 08/12/21  1515 " "  *             Passed - No abnormal creatine kinase in past 12 months     No lab results found.             Passed - Recent (12 mo) or future (30 days) visit within the authorizing provider's specialty     Patient has had an office visit with the authorizing provider or a provider within the authorizing providers department within the previous 12 mos or has a future within next 30 days. See \"Patient Info\" tab in inbasket, or \"Choose Columns\" in Meds & Orders section of the refill encounter.              Passed - Medication is active on med list        Passed - Patient is age 18 or older             Andrew Ley RN 11/23/21 11:14 AM  "

## 2021-11-23 NOTE — TELEPHONE ENCOUNTER
"Last Written Prescription Date:  8/12/21  Last Fill Quantity: 90,  # refills: 0   Last office visit provider:  8/12/21     Requested Prescriptions   Pending Prescriptions Disp Refills     losartan-hydrochlorothiazide (HYZAAR) 100-25 MG tablet [Pharmacy Med Name: LOSARTAN/HCTZ 100/25MG TABLETS] 90 tablet 0     Sig: TAKE 1 TABLET BY MOUTH DAILY       Angiotensin-II Receptors Failed - 11/21/2021  2:15 PM        Failed - Last blood pressure under 140/90 in past 12 months     BP Readings from Last 3 Encounters:   08/12/21 (!) 158/100   01/27/21 116/84   05/21/20 (!) 158/120                 Passed - Recent (12 mo) or future (30 days) visit within the authorizing provider's specialty     Patient has had an office visit with the authorizing provider or a provider within the authorizing providers department within the previous 12 mos or has a future within next 30 days. See \"Patient Info\" tab in inbasket, or \"Choose Columns\" in Meds & Orders section of the refill encounter.              Passed - Medication is active on med list        Passed - Patient is age 18 or older        Passed - Normal serum creatinine on file in past 12 months     Recent Labs   Lab Test 08/12/21  1515   CR 1.13       Ok to refill medication if creatinine is low          Passed - Normal serum potassium on file in past 12 months     Recent Labs   Lab Test 08/12/21  1515   POTASSIUM 4.6                   Diuretics (Including Combos) Protocol Failed - 11/21/2021  2:15 PM        Failed - Blood pressure under 140/90 in past 12 months     BP Readings from Last 3 Encounters:   08/12/21 (!) 158/100   01/27/21 116/84   05/21/20 (!) 158/120                 Passed - Recent (12 mo) or future (30 days) visit within the authorizing provider's specialty     Patient has had an office visit with the authorizing provider or a provider within the authorizing providers department within the previous 12 mos or has a future within next 30 days. See \"Patient Info\" tab in " "inbasket, or \"Choose Columns\" in Meds & Orders section of the refill encounter.              Passed - Medication is active on med list        Passed - Patient is age 18 or older        Passed - Normal serum creatinine on file in past 12 months     Recent Labs   Lab Test 08/12/21  1515   CR 1.13              Passed - Normal serum potassium on file in past 12 months     Recent Labs   Lab Test 08/12/21  1515   POTASSIUM 4.6                    Passed - Normal serum sodium on file in past 12 months     Recent Labs   Lab Test 08/12/21  1515                    rosuvastatin (CRESTOR) 10 MG tablet [Pharmacy Med Name: ROSUVASTATIN 10MG TABLETS] 90 tablet 0     Sig: TAKE 1 TABLET(10 MG) BY MOUTH DAILY       Statins Protocol Passed - 11/21/2021  2:15 PM        Passed - LDL on file in past 12 months     Recent Labs   Lab Test 08/12/21  1515   *             Passed - No abnormal creatine kinase in past 12 months     No lab results found.             Passed - Recent (12 mo) or future (30 days) visit within the authorizing provider's specialty     Patient has had an office visit with the authorizing provider or a provider within the authorizing providers department within the previous 12 mos or has a future within next 30 days. See \"Patient Info\" tab in inbasket, or \"Choose Columns\" in Meds & Orders section of the refill encounter.              Passed - Medication is active on med list        Passed - Patient is age 18 or older           metoprolol succinate ER (TOPROL-XL) 100 MG 24 hr tablet [Pharmacy Med Name: METOPROLOL ER SUCCINATE 100MG TABS] 90 tablet 0     Sig: TAKE 1 TABLET(100 MG) BY MOUTH DAILY       Beta-Blockers Protocol Failed - 11/21/2021  2:15 PM        Failed - Blood pressure under 140/90 in past 12 months     BP Readings from Last 3 Encounters:   08/12/21 (!) 158/100   01/27/21 116/84   05/21/20 (!) 158/120                 Passed - Patient is age 6 or older        Passed - Recent (12 mo) or future (30 " "days) visit within the authorizing provider's specialty     Patient has had an office visit with the authorizing provider or a provider within the authorizing providers department within the previous 12 mos or has a future within next 30 days. See \"Patient Info\" tab in inbasket, or \"Choose Columns\" in Meds & Orders section of the refill encounter.              Passed - Medication is active on med list             Andrew Ley RN 11/23/21 11:13 AM  "

## 2021-12-02 ENCOUNTER — THERAPY VISIT (OUTPATIENT)
Dept: SLEEP MEDICINE | Facility: CLINIC | Age: 56
End: 2021-12-02
Payer: COMMERCIAL

## 2021-12-02 DIAGNOSIS — R06.83 SNORING: ICD-10-CM

## 2021-12-02 DIAGNOSIS — F45.8 BRUXISM: ICD-10-CM

## 2021-12-02 DIAGNOSIS — G47.50 PARASOMNIA, UNSPECIFIED TYPE: ICD-10-CM

## 2021-12-02 DIAGNOSIS — R06.81 WITNESSED EPISODE OF APNEA: ICD-10-CM

## 2021-12-02 DIAGNOSIS — G47.10 HYPERSOMNIA: ICD-10-CM

## 2021-12-02 PROCEDURE — 95810 POLYSOM 6/> YRS 4/> PARAM: CPT | Performed by: FAMILY MEDICINE

## 2021-12-09 ENCOUNTER — TELEPHONE (OUTPATIENT)
Dept: SLEEP MEDICINE | Facility: CLINIC | Age: 56
End: 2021-12-09
Payer: COMMERCIAL

## 2021-12-09 LAB — SLPCOMP: NORMAL

## 2021-12-13 DIAGNOSIS — G47.33 OSA (OBSTRUCTIVE SLEEP APNEA): ICD-10-CM

## 2021-12-13 DIAGNOSIS — G47.10 HYPERSOMNIA: Primary | ICD-10-CM

## 2021-12-14 ASSESSMENT — SLEEP AND FATIGUE QUESTIONNAIRES
HOW LIKELY ARE YOU TO NOD OFF OR FALL ASLEEP WHILE WATCHING TV: HIGH CHANCE OF DOZING
HOW LIKELY ARE YOU TO NOD OFF OR FALL ASLEEP IN A CAR, WHILE STOPPED FOR A FEW MINUTES IN TRAFFIC: MODERATE CHANCE OF DOZING
HOW LIKELY ARE YOU TO NOD OFF OR FALL ASLEEP WHILE LYING DOWN TO REST IN THE AFTERNOON WHEN CIRCUMSTANCES PERMIT: SLIGHT CHANCE OF DOZING
HOW LIKELY ARE YOU TO NOD OFF OR FALL ASLEEP WHILE SITTING AND TALKING TO SOMEONE: SLIGHT CHANCE OF DOZING
HOW LIKELY ARE YOU TO NOD OFF OR FALL ASLEEP WHEN YOU ARE A PASSENGER IN A CAR FOR AN HOUR WITHOUT A BREAK: MODERATE CHANCE OF DOZING
HOW LIKELY ARE YOU TO NOD OFF OR FALL ASLEEP WHILE SITTING INACTIVE IN A PUBLIC PLACE: HIGH CHANCE OF DOZING
HOW LIKELY ARE YOU TO NOD OFF OR FALL ASLEEP WHILE SITTING AND READING: HIGH CHANCE OF DOZING
HOW LIKELY ARE YOU TO NOD OFF OR FALL ASLEEP WHILE SITTING QUIETLY AFTER LUNCH WITHOUT ALCOHOL: MODERATE CHANCE OF DOZING

## 2021-12-15 NOTE — PROGRESS NOTES
"Randal Barajas is a 55 year old male who is being evaluated via a billable video visit.       The patient has been notified of following:      \"This video visit will be conducted via a call between you and your physician/provider. We have found that certain health care needs can be provided without the need for an in-person physical exam.  This service lets us provide the care you need with a video conversation.  If a prescription is necessary we can send it directly to your pharmacy.  If lab work is needed we can place an order for that and you can then stop by our lab to have the test done at a later time.     Video visits are billed at different rates depending on your insurance coverage.  Please reach out to your insurance provider with any questions.     If during the course of the call the physician/provider feels a video visit is not appropriate, you will not be charged for this service.\"     Patient has given verbal consent for Video visit? Yes  How would you like to obtain your AVS? Mail a copy  If you are dropped from the video visit, the video invite should be resent to: Text to cell phone: -  Will anyone else be joining your video visit? No  If patient encounters technical issues they should call [phone]      Video-Visit Details     Type of service:  Video Visit     Video Start Time: 0800  Video End Time: 0815    Originating Location (pt. Location): Home     Distant Location (provider location):  [facility]      Platform used for Video Visit: The Infatuation    Virtual visit for review of PSG results.     Assessment:  - Severe CHUN (.5, .5) with significant sleep-associated hypoxemia and sustained hypoxemia (appears secondary to prolonged apneas, SpO2 would often nearly normalize between events)  in lateral and supine REM.  - No PLM's observed, though difficult to assess given frequent obstructive events and cortical arousals.  - No abnormal nocturnal behaviors or " evidence of REM atonia observed, but again difficult with highly severe CHUN    Plan:  - Recommend start of treatment with CPAP auto-titrate 5-15 cm H2O with follow-up WatchPAT HST to assess SpO2 in 1-2 months.  - Placed orders for CPAP auto-titrate 5-15 cm H2O, placed as urgent / ASAP given severity.    SUBJECTIVE:  Randal Barajas is a 55 year old male.    Pertinent PMHx of obesity, HLD, HTN, obesity.    10/7/2021 - Initial consult.  Eh presents today with multiple sleep related concerns. He reports that he has snored in his sleep for many years, however that this has worsened over the last year and a half. He does note that he has gained a significant amount of weight in a similar time frame as well. The patient's wife has told him that he stops breathing multiple times throughout the night as well. Along with his snoring he has also experienced significant daytime sleepiness, to the point that his boss at work has had a conversation with him about this. He also notes frequent AM headaches. Sleep is further complicated by reported fragmentation, with Eh noting that he typically wakes up once or twice during the night to use the bathroom.     Along with the above Eh also notes that he has grinded his teeth during sleep since childhood, however this too has worsened in approximately the last 6 months. This is frequent and severe enough now that he frequently he frequently bites his tongue to the point that it is swollen the next morning which concerns him.     Finally, Eh reports a history of sleepwalking which began as a chid. He reports that his wife no longer sleeps in bed with him due to his frequent jerking arm and leg movements. Eh has fallen out of bed several times due to this, with one previous incident resulting in a gash over his left eye. He reports that he will often wake up kneeling next to the bed in the morning. There has not been any associated incontinence of the bowel or bladder with  this.     BRO Total Score: 21    A/P for in-lab PSG.    Today - Reviewed PSG results in detail.       SLEEP STUDY INTERPRETATION  DIAGNOSTIC POLYSOMNOGRAPHY REPORT      Patient: MATT WHITE  YOB: 1965  Study Date: 12/2/2021  MRN: [id number]  Referring Provider: Willian Wright NP  Ordering Provider: Shin Sousa MD    Indications for Polysomnography: The patient is a 55 year old Male who is 6' and weighs 300.0 lbs. His BMI is 41.1, [place] sleepiness scale - and neck circumference is - cm. Relevant medical history includes obesity, HLD, HTN. A diagnostic polysomnogram was performed to evaluate for sleep apnea.    Polysomnogram Data: A full night polysomnogram recorded the standard physiologic parameters including EEG, EOG, EMG, ECG, nasal and oral airflow. Respiratory parameters of chest and abdominal movements were recorded with respiratory inductance plethysmography. Oxygen saturation was recorded by pulse oximetry. Hypopnea scoring rule used: 1B 4%.    Sleep Architecture: Highly fragmented, decreased sleep latency, increased arousal index.  Supine REM was observed.  The total recording time of the polysomnogram was 425.2 minutes. The total sleep time was 393.0 minutes. Sleep latency was decreased at 0.5 minutes without the use of a sleep aid. REM latency was 93.0 minutes. Arousal index was increased at 90.2 arousals per hour. Sleep efficiency was normal at 92.4%. Wake after sleep onset was 29.5 minutes. The patient spent 35.6% of total sleep time in Stage N1, 51.1% in Stage N2, 0.0% in Stage N3, and 13.2% in REM. Time in REM supine was 31.5 minutes.    Respiration: Severe CHUN (.5, .5) with significant sleep-associated hypoxemia and sustained hypoxemia (appears secondary to prolonged apneas, SpO2 would often nearly normalize between events)  in lateral and supine REM.    Events ? The polysomnogram revealed a presence of 592 obstructive, 7 central, and 43 mixed apneas  resulting in an apnea index of 98.0 events per hour. There were 16 obstructive hypopneas and - central hypopneas resulting in an obstructive hypopnea index of 2.4 and central hypopnea index of - events per hour. The combined apnea/hypopnea index was 100.5 events per hour (central apnea/hypopnea index was 1.1 events per hour). The REM AHI was 83.1 events per hour. The supine AHI was 100.7 events per hour. The RERA index was - events per hour.  The RDI was 100.5 events per hour.    Snoring - was reported as loud with snorts.    Respiratory rate and pattern - was notable for normal respiratory rate and pattern.    Sustained Sleep Associated Hypoventilation - Transcutaneous carbon dioxide monitoring was not used, and there is suggestion of significant hypoventilation during REM based on oximetry.    Sleep Associated Hypoxemia - (Greater than 5 minutes O2 sat at or below 88%) was present. Baseline oxygen saturation was 83.9%. Lowest oxygen saturation was 35.7%. Time spent less than or equal to 88% was 229.3 minutes. Time spent less than or equal to 89% was 240.9 minutes.    Movement Activity: Nothing of note, though somewhat hard to assess given frequent cortical arousals from frequent obstructive events.    Periodic Limb Activity - There were - PLMs during the entire study. The PLM index was - movements per hour. The PLM Arousal Index was - per hour.    REM EMG Activity - Excessive transient/sustained muscle activity was not present.    Nocturnal Behavior - Abnormal sleep related behaviors were not noted during/arising out of NREM / REM sleep.     Bruxism - None apparent.    Cardiac Summary: Appears NSR  The average pulse rate was 76.1 bpm. The minimum pulse rate was 42.7 bpm while the maximum pulse rate was 103.2 bpm.      Assessment:     Sleep architecture was highly fragmented, decreased sleep latency, increased arousal index.  Supine REM was observed.    Severe CHUN (.5, .5) with significant  sleep-associated hypoxemia and sustained hypoxemia (appears secondary to prolonged apneas, SpO2 would often nearly normalize between events)  in lateral and supine REM.    EKG appears NSR.    Recommendations:    Consider repeat polysomnography with full night titration of positive airway pressure therapy for the control of sleep disordered breathing.    Based on the presence of severe obstructive sleep apnea and excessive daytime sleepiness, treatment could be empirically initiated with Auto?titrating PAP therapy with a range of 5 to 15 cmH2O. Recommend clinical follow up with sleep management team and follow-up overnight oximetry on treatment.    Advice regarding the risks of drowsy driving.    Suggest optimizing sleep schedule and avoiding sleep deprivation.    Weight management (if BMI > 30).    Diagnostic Codes:   Obstructive Sleep Apnea G47.33  Sleep Hypoxemia/Hypoventilation G47.36     12/2/2021 [place] [facility] Study (300.0 lbs) - .5, .5, Supine .7, REM AHI 83.1, Low O2 35.7%, Time Spent ?88% 229.3 minutes / Time Spent ?89% 240.9 minutes.      _____________________________________   Electronically Signed By: Shin Sousa MD (12/9/2021)               Insomnia Severity Index    Difficulty falling asleep 0    Difficulty staying asleep 2    Problems waking up too early 2    How SATISFIED/DISSATISFIED are you with your CURRENT sleep pattern? 3    How NOTICEABLE to others do you think your sleep problem is in terms of impairing the quality of your life? 3    How WORRIED/DISTRESSED are you about your current sleep problem? 3    To what extent do you consider your sleep problem to INTERFERE with your daily functioning (e.g. daytime fatigue, mood, ability to function at work/daily chores, concentration, memory, mood, etc.) CURRENTLY? 4    Total Score 17        Past medical history:    Patient Active Problem List    Diagnosis Date Noted     Encounter for screening for malignant  neoplasm of colon 01/27/2021     Priority: Medium     Hyperlipemia 05/22/2020     Priority: Medium     Obesity (BMI 35.0-39.9) with comorbidity (H) 03/28/2019     Priority: Medium     Pulmonary nodule 02/20/2017     Priority: Medium     Identified 11/30/2015 on CTA--needs one year follow-up         Prepatellar bursitis of left knee 08/20/2016     Priority: Medium     History of DVT (deep vein thrombosis) 12/01/2015     Priority: Medium     Replacement Utility updated for latest IMO load         Hypertension with goal blood pressure less than 140/90 12/01/2015     Priority: Medium     BP Readings from Last 1 Encounters:   11/02/16 (!) 144/102          History of pulmonary embolism 11/30/2015     Priority: Medium       10 point ROS of systems including Constitutional, Eyes, Respiratory, Cardiovascular, Gastroenterology, Genitourinary, Integumentary, Muscularskeletal, Psychiatric were all negative except for pertinent positives noted in my HPI.    Current Outpatient Medications   Medication Sig Dispense Refill     LORazepam (ATIVAN) 0.5 MG tablet [LORAZEPAM (ATIVAN) 0.5 MG TABLET] Take 1 tablet (0.5 mg total) by mouth 3 (three) times a day as needed for anxiety. (Patient not taking: Reported on 10/5/2021) 15 tablet 0     losartan-hydrochlorothiazide (HYZAAR) 100-25 MG tablet TAKE 1 TABLET BY MOUTH DAILY 90 tablet 0     metoprolol succinate ER (TOPROL-XL) 100 MG 24 hr tablet TAKE 1 TABLET(100 MG) BY MOUTH DAILY 90 tablet 0     rosuvastatin (CRESTOR) 10 MG tablet Take 1 tablet (10 mg) by mouth daily 90 tablet 2     tadalafiL (CIALIS) 20 MG tablet [TADALAFIL (CIALIS) 20 MG TABLET] Take 1 tablet (20 mg total) by mouth daily as needed for erectile dysfunction. (Patient not taking: Reported on 10/5/2021) 30 tablet PRN       OBJECTIVE:  There were no vitals taken for this visit.    Physical Exam     ---  This note was written with the assistance of the Dragon voice-dictation technology software. The final document, although  reviewed, may contain errors. For corrections, please contact the office.    Shin Sousa MD    Sleep Medicine  [facility]  ([phone])  [facility]  ([phone])    Time spent on the date of service:  30 minutes.

## 2021-12-16 ENCOUNTER — VIRTUAL VISIT (OUTPATIENT)
Dept: SLEEP MEDICINE | Facility: CLINIC | Age: 56
End: 2021-12-16
Payer: COMMERCIAL

## 2021-12-16 VITALS — HEIGHT: 72 IN | BODY MASS INDEX: 40.63 KG/M2 | WEIGHT: 300 LBS

## 2021-12-16 DIAGNOSIS — G47.10 HYPERSOMNIA: ICD-10-CM

## 2021-12-16 DIAGNOSIS — G47.33 OSA (OBSTRUCTIVE SLEEP APNEA): Primary | ICD-10-CM

## 2021-12-16 PROCEDURE — 99203 OFFICE O/P NEW LOW 30 MIN: CPT | Mod: 95 | Performed by: FAMILY MEDICINE

## 2021-12-16 ASSESSMENT — PAIN SCALES - GENERAL: PAINLEVEL: NO PAIN (0)

## 2021-12-16 ASSESSMENT — MIFFLIN-ST. JEOR: SCORE: 2228.79

## 2021-12-16 NOTE — PROGRESS NOTES
"Eh is a 55 year old who is being evaluated via a billable video visit.      How would you like to obtain your AVS? MyChart  If the video visit is dropped, the invitation should be resent by: Send to e-mail at: [email or web address]  Will anyone else be joining your video visit? No  {If patient encounters technical issues they should call [phone] :[id number]}    Video Start Time: {video visit start/end time for provider to select:152948}  Video-Visit Details    Type of service:  Video Visit    Video End Time:{video visit start/end time for provider to select:152948}    Originating Location (pt. Location): {video visit patient location:081652::\"Home\"}    Distant Location (provider location):  [facility]     Platform used for Video Visit: {Virtual Visit Platforms:223633::\"Dom[employer] Income\"}       Medication and allergies have been reviewed.       ELZA Morales     "

## 2021-12-16 NOTE — PATIENT INSTRUCTIONS

## 2021-12-24 ENCOUNTER — NURSE TRIAGE (OUTPATIENT)
Dept: NURSING | Facility: CLINIC | Age: 56
End: 2021-12-24
Payer: COMMERCIAL

## 2021-12-24 NOTE — TELEPHONE ENCOUNTER
TRIAGE CALL     Patient calling - COVID/flu symptoms  He was at party with 50 people Saturday  symptoms on Sunday  Has been tested for COVID 19 Monday and Tuesday both NEG  Reports:  Congestion  Sore throat  Temp:100.0 hour ago  body aches  Coughing a lot of phlegm  Symptoms/concern started last sunday   Pain 6/10  Location and description of pain Throat and body   Medications taken today Day and [place]   Patient denies difficulty with breathing, , fever  Patient/Child able to Speak in full long sentences but RN hears him very congested and with some  SOB.  Discomfort does keep patient from eating  Pt  CANNOT rest/ sleep   He has burning chest pain constant.  Per protocol, disposition to ER now   Patient verbalized understanding and agrees with plan    Nolvia Alvarez RN Nurse Triage Advisor 8:35 AM 12/24/2021    Care advise given, patients questions were answered  Reminded we will be here 24/7 and can call back and ask to speak with a nurse.     Reason for Disposition    SEVERE or constant chest pain or pressure (Exception: mild central chest pain, present only when coughing)    Additional Information    Negative: SEVERE difficulty breathing (e.g., struggling for each breath, speaks in single words)    Negative: Difficult to awaken or acting confused (e.g., disoriented, slurred speech)    Negative: Bluish (or gray) lips or face now    Negative: Shock suspected (e.g., cold/pale/clammy skin, too weak to stand, low BP, rapid pulse)    Negative: Sounds like a life-threatening emergency to the triager    Negative: [1] COVID-19 exposure AND [2] no symptoms    Negative: COVID-19 vaccine reaction suspected (e.g., fever, headache, muscle aches) occurring 1 to 3 days after getting vaccine    Negative: COVID-19 vaccine, questions about    Negative: [1] Lives with someone known to have influenza (flu test positive) AND [2] flu-like symptoms (e.g., cough, runny nose, sore throat, SOB; with or without fever)    Negative: [1]  Adult with possible COVID-19 symptoms AND [2] triager concerned about severity of symptoms or other causes    Negative: COVID-19 and breastfeeding, questions about    Protocols used: CORONAVIRUS (COVID-19) DIAGNOSED OR [id number] 8.25.2021

## 2021-12-29 ENCOUNTER — DOCUMENTATION ONLY (OUTPATIENT)
Dept: SLEEP MEDICINE | Facility: CLINIC | Age: 56
End: 2021-12-29
Payer: COMMERCIAL

## 2021-12-29 DIAGNOSIS — G47.33 OBSTRUCTIVE SLEEP APNEA (ADULT) (PEDIATRIC): Primary | ICD-10-CM

## 2021-12-29 NOTE — PROGRESS NOTES
Patient was offered choice of vendor and chose [facility].  Patient Randal Barajas was set up at [place] on December 29, 2021. Patient received a Resmed Airsense 11 Pressures were set at 5-15 cm H2O.   Patient s ramp is 5 cm H2O for Auto and FLEX/EPR is 2.  Patient received a Resmed Mask name: AIRFIT F20  Full Face mask size Large, heated tubing and heated humidifier.  Patient does need to meet compliance. Patient has a follow up on TBD with TBD.    Heidi Mcguire

## 2022-01-03 ENCOUNTER — DOCUMENTATION ONLY (OUTPATIENT)
Dept: SLEEP MEDICINE | Facility: CLINIC | Age: 57
End: 2022-01-03
Payer: COMMERCIAL

## 2022-01-03 NOTE — PROGRESS NOTES
3 day Sleep therapy management telephone visit    Diagnostic AHI: 100.5  PSG    Confirmed with patient at time of call- Yes Patient is still interested in STM service       Subjective measures:  Patient having trouble exhaling against the pressure.  When he first goes to sleep he feels like he doesn't get enough CPAP pressure.        Objective data     Order Settings for PAP  CPAP min 5    CPAP max 15        Device settings from machine CPAP min 5.0     CPAP max 15.0      EPR Setting TWO    RESMED soft response  OFF     Assessment: Nighty usage under four hours      Action plan: Patient to have 14 day STM visit. Patient has a follow up visit scheduled:   no    Replacement device: No  STM ordered by provider: Yes     Total time spent on accessing and  interpreting remote patient PAP therapy data  10 minutes    Total time spent counseling, coaching  and reviewing PAP therapy data with patient  3 minutes    [id number] no

## 2022-01-11 ENCOUNTER — DOCUMENTATION ONLY (OUTPATIENT)
Dept: SLEEP MEDICINE | Facility: CLINIC | Age: 57
End: 2022-01-11
Payer: COMMERCIAL

## 2022-01-11 NOTE — PROGRESS NOTES
PT HAS BEEN SCHEDULED FOR A 30 DAY MASK EXCHANGE ON 01/12/2022 AT 3:30 AT THE [facility] [place] SHOWROOM.

## 2022-01-12 ENCOUNTER — DOCUMENTATION ONLY (OUTPATIENT)
Dept: SLEEP MEDICINE | Facility: CLINIC | Age: 57
End: 2022-01-12
Payer: COMMERCIAL

## 2022-01-12 DIAGNOSIS — G47.33 OBSTRUCTIVE SLEEP APNEA (ADULT) (PEDIATRIC): Primary | ICD-10-CM

## 2022-01-13 ENCOUNTER — DOCUMENTATION ONLY (OUTPATIENT)
Dept: SLEEP MEDICINE | Facility: CLINIC | Age: 57
End: 2022-01-13
Payer: COMMERCIAL

## 2022-01-13 NOTE — PROGRESS NOTES
14  DAY STM VISIT    Diagnostic AHI: 100.5  PSG    Subjective measures:    Patient received his new mask yesterday his last 3 nights he has slept well using his CPAP.  Patient feels the heat is to hot and wanted it turned down.      Assessment: Pt not meeting objective benchmarks for compliance  Patient meeting subjective benchmarks.     Action plan: pt to have 30 day STM visit.  Turned down patient humidity to 72 degrees.      Device type: Auto-CPAP    PAP settings: CPAP min 5.0 cm  H20       CPAP max 15.0 cm  H20      95th% pressure 12.1 cm  H20        RESMED EPR level Setting: TWO    RESMED Soft response setting:  OFF    Mask type:  Nasal Pillows    Objective measures: 14 day rolling measures      Compliance  35 %      Leak  96  lpm  last  upload      AHI 18.19   last  upload      Average number of minutes 211      Objective measure goal  Compliance   Goal >70%  Leak   Goal < 24 lpm  AHI  Goal < 5  Usage  Goal >240        Total time spent on accessing and interpreting remote patient PAP therapy data  10 minutes    Total time spent counseling, coaching  and reviewing PAP therapy data with patient  2 minutes    [id number]  [id number]  no (3 day STM)

## 2022-01-27 ENCOUNTER — ALLIED HEALTH/NURSE VISIT (OUTPATIENT)
Dept: FAMILY MEDICINE | Facility: CLINIC | Age: 57
End: 2022-01-27
Payer: COMMERCIAL

## 2022-01-27 VITALS — HEART RATE: 81 BPM | OXYGEN SATURATION: 98 % | DIASTOLIC BLOOD PRESSURE: 72 MMHG | SYSTOLIC BLOOD PRESSURE: 132 MMHG

## 2022-01-27 DIAGNOSIS — Z01.30 BP CHECK: Primary | ICD-10-CM

## 2022-01-27 PROCEDURE — 99207 PR NO CHARGE NURSE ONLY: CPT

## 2022-01-27 NOTE — PROGRESS NOTES
Randal Barajas is a 56 year old patient who comes in today for a Blood Pressure check.  Initial BP:  /72 (BP Location: Left arm, Patient Position: Sitting, Cuff Size: Adult Large)   Pulse 81   SpO2 98%      81  Disposition: results routed to provider    Sofía Mosley CMA.

## 2022-02-01 ENCOUNTER — DOCUMENTATION ONLY (OUTPATIENT)
Dept: SLEEP MEDICINE | Facility: CLINIC | Age: 57
End: 2022-02-01
Payer: COMMERCIAL

## 2022-02-01 NOTE — PROGRESS NOTES
30 DAY [facility] VISIT    Diagnostic AHI: 100.5  PSG    Message left for patient to return call     Assessment: Pt not meeting objective benchmarks for AHI and leak    Action plan: waiting for patient to return call.   Patient has not scheduled a follow up visit.   Device type: Auto-CPAP  PAP settings: CPAP min 5.0 cm  H20     CPAP max 15.0 cm  H20    95th% pressure 13.5 cm  H20      RESMED EPR level Setting: THREE    RESMED Soft response setting:  OFF  Mask type:  Nasal Pillows  Objective measures: 14 day rolling measures      Compliance  100 %      Leak  69.04 lpm  last  upload      AHI 13.02   last  upload      Average number of minutes 381      Objective measure goal  Compliance   Goal >70%  Leak   Goal < 24 lpm  AHI  Goal < 5  Usage  Goal >240        Total time spent on accessing and interpreting remote patient PAP therapy data  3 minutes    Total time spent counseling, coaching  and reviewing PAP therapy data with patient  0 minutes     [id number] this call  [id number] no  at 3 or 14 day [facility]

## 2022-02-07 ENCOUNTER — MYC MEDICAL ADVICE (OUTPATIENT)
Dept: SLEEP MEDICINE | Facility: CLINIC | Age: 57
End: 2022-02-07
Payer: COMMERCIAL

## 2022-02-07 DIAGNOSIS — G47.33 OSA (OBSTRUCTIVE SLEEP APNEA): Primary | ICD-10-CM

## 2022-02-16 ENCOUNTER — TELEPHONE (OUTPATIENT)
Dept: SLEEP MEDICINE | Facility: CLINIC | Age: 57
End: 2022-02-16
Payer: COMMERCIAL

## 2022-02-18 DIAGNOSIS — I10 HYPERTENSION WITH GOAL BLOOD PRESSURE LESS THAN 140/90: ICD-10-CM

## 2022-02-21 RX ORDER — METOPROLOL SUCCINATE 100 MG/1
TABLET, EXTENDED RELEASE ORAL
Qty: 90 TABLET | Refills: 1 | Status: SHIPPED | OUTPATIENT
Start: 2022-02-21 | End: 2022-08-17

## 2022-02-21 RX ORDER — LOSARTAN POTASSIUM AND HYDROCHLOROTHIAZIDE 25; 100 MG/1; MG/1
TABLET ORAL
Qty: 90 TABLET | Refills: 1 | Status: SHIPPED | OUTPATIENT
Start: 2022-02-21 | End: 2022-08-17

## 2022-02-21 NOTE — TELEPHONE ENCOUNTER
"Last Written Prescription Date:  11/23/21  Last Fill Quantity: 90,  # refills: 0   Last office visit provider:  8/12/21     Requested Prescriptions   Pending Prescriptions Disp Refills     losartan-hydrochlorothiazide (HYZAAR) 100-25 MG tablet [Pharmacy Med Name: LOSARTAN/HCTZ 100/25MG TABLETS] 90 tablet 0     Sig: TAKE 1 TABLET BY MOUTH DAILY       Angiotensin-II Receptors Passed - 2/18/2022  7:48 AM        Passed - Last blood pressure under 140/90 in past 12 months     BP Readings from Last 3 Encounters:   01/27/22 132/72   08/12/21 (!) 158/100   01/27/21 116/84                 Passed - Recent (12 mo) or future (30 days) visit within the authorizing provider's specialty     Patient has had an office visit with the authorizing provider or a provider within the authorizing providers department within the previous 12 mos or has a future within next 30 days. See \"Patient Info\" tab in [facility] [place], or \"Choose Columns\" in Meds & Orders section of the refill encounter.              Passed - Medication is active on med list        Passed - Patient is age 18 or older        Passed - Normal serum creatinine on file in past 12 months     Recent Labs   Lab Test 08/12/21  1515   CR 1.13       Ok to refill medication if creatinine is low          Passed - Normal serum potassium on file in past 12 months     Recent Labs   Lab Test 08/12/21  1515   POTASSIUM 4.6                   Diuretics (Including Combos) Protocol Passed - 2/18/2022  7:48 AM        Passed - Blood pressure under 140/90 in past 12 months     BP Readings from Last 3 Encounters:   01/27/22 132/72   08/12/21 (!) 158/100   01/27/21 116/84                 Passed - Recent (12 mo) or future (30 days) visit within the authorizing provider's specialty     Patient has had an office visit with the authorizing provider or a provider within the authorizing providers department within the previous 12 mos or has a future within next 30 days. See \"Patient Info\" tab in [facility], or " "\"Choose Columns\" in Meds & Orders section of the refill encounter.              Passed - Medication is active on med list        Passed - Patient is age 18 or older        Passed - Normal serum creatinine on file in past 12 months     Recent Labs   Lab Test 08/12/21  1515   CR 1.13              Passed - Normal serum potassium on file in past 12 months     Recent Labs   Lab Test 08/12/21  1515   POTASSIUM 4.6                    Passed - Normal serum sodium on file in past 12 months     Recent Labs   Lab Test 08/12/21  1515                    metoprolol succinate ER (TOPROL-XL) 100 MG 24 hr tablet [Pharmacy Med Name: METOPROLOL ER SUCCINATE 100MG TABS] 90 tablet 0     Sig: TAKE 1 TABLET(100 MG) BY MOUTH DAILY       Beta-Blockers Protocol Passed - 2/18/2022  7:48 AM        Passed - Blood pressure under 140/90 in past 12 months     BP Readings from Last 3 Encounters:   01/27/22 132/72   08/12/21 (!) 158/100   01/27/21 116/84                 Passed - Patient is age 6 or older        Passed - Recent (12 mo) or future (30 days) visit within the authorizing provider's specialty     Patient has had an office visit with the authorizing provider or a provider within the authorizing providers department within the previous 12 mos or has a future within next 30 days. See \"Patient Info\" tab in [facility], or \"Choose Columns\" in Meds & Orders section of the refill encounter.              Passed - Medication is active on med list             Alma Blackwell RN 02/21/22 8:37 AM  "

## 2022-03-31 DIAGNOSIS — N52.2 DRUG-INDUCED ERECTILE DYSFUNCTION: ICD-10-CM

## 2022-04-01 NOTE — TELEPHONE ENCOUNTER
"Routing refill request to provider for review/approval because:  Over 1 year since last prescription for this medicatoin, request PCP review    Last Written Prescription Date:    tadalafiL (CIALIS) 20 MG tablet 30 tablet PRN 1/27/2021  No   Sig - Route: [TADALAFIL (CIALIS) 20 MG TABLET] Take 1 tablet (20 mg total) by mouth daily as needed for erectile dysfunction. - Oral   Class: Print   Order: [id number]     Last office visit provider:  8/12/21     Requested Prescriptions   Pending Prescriptions Disp Refills     tadalafil (CIALIS) 20 MG tablet [Pharmacy Med Name: TADALAFIL 20MG TABS] 30 tablet PRN     Sig: TAKE ONE TABLET BY MOUTH EVERY DAY AS NEEDED       Erectile Dysfuction Protocol Passed - 3/31/2022  8:26 AM        Passed - Absence of nitrates on medication list        Passed - Absence of Alpha Blockers on Med list        Passed - Recent (12 mo) or future (30 days) visit within the authorizing provider's specialty     Patient has had an office visit with the authorizing provider or a provider within the authorizing providers department within the previous 12 mos or has a future within next 30 days. See \"Patient Info\" tab in inbasket, or \"Choose Columns\" in Meds & Orders section of the refill encounter.              Passed - Medication is active on med list        Passed - Patient is age 18 or older             Daniela Haskins RN 04/01/22 4:20 PM  "

## 2022-04-02 RX ORDER — TADALAFIL 20 MG/1
TABLET ORAL
Qty: 30 TABLET | Status: SHIPPED | OUTPATIENT
Start: 2022-04-02 | End: 2022-09-20

## 2022-04-02 NOTE — TELEPHONE ENCOUNTER
Refill sent. Please call patient. Due for physical so we can also update labs since adjusting meds last summer.    Willian Wright, CNP

## 2022-09-20 ENCOUNTER — OFFICE VISIT (OUTPATIENT)
Dept: FAMILY MEDICINE | Facility: CLINIC | Age: 57
End: 2022-09-20
Payer: COMMERCIAL

## 2022-09-20 VITALS
WEIGHT: 255.7 LBS | HEART RATE: 67 BPM | HEIGHT: 70 IN | DIASTOLIC BLOOD PRESSURE: 78 MMHG | BODY MASS INDEX: 36.61 KG/M2 | TEMPERATURE: 98.5 F | SYSTOLIC BLOOD PRESSURE: 112 MMHG | OXYGEN SATURATION: 98 %

## 2022-09-20 DIAGNOSIS — Z00.00 ROUTINE GENERAL MEDICAL EXAMINATION AT A HEALTH CARE FACILITY: Primary | ICD-10-CM

## 2022-09-20 DIAGNOSIS — Z12.5 SCREENING FOR PROSTATE CANCER: ICD-10-CM

## 2022-09-20 DIAGNOSIS — N52.2 DRUG-INDUCED ERECTILE DYSFUNCTION: ICD-10-CM

## 2022-09-20 DIAGNOSIS — E78.5 HYPERLIPIDEMIA, UNSPECIFIED HYPERLIPIDEMIA TYPE: ICD-10-CM

## 2022-09-20 DIAGNOSIS — E66.01 MORBID OBESITY (H): ICD-10-CM

## 2022-09-20 DIAGNOSIS — Z23 NEEDS FLU SHOT: ICD-10-CM

## 2022-09-20 DIAGNOSIS — M21.42 FLAT FEET: ICD-10-CM

## 2022-09-20 DIAGNOSIS — R42 VERTIGO: ICD-10-CM

## 2022-09-20 DIAGNOSIS — K42.9 UMBILICAL HERNIA WITHOUT OBSTRUCTION AND WITHOUT GANGRENE: ICD-10-CM

## 2022-09-20 DIAGNOSIS — I10 HYPERTENSION WITH GOAL BLOOD PRESSURE LESS THAN 140/90: ICD-10-CM

## 2022-09-20 DIAGNOSIS — G56.20 CUBITAL TUNNEL SYNDROME, UNSPECIFIED LATERALITY: ICD-10-CM

## 2022-09-20 DIAGNOSIS — M21.41 FLAT FEET: ICD-10-CM

## 2022-09-20 PROBLEM — R91.1 PULMONARY NODULE: Status: RESOLVED | Noted: 2017-02-20 | Resolved: 2022-09-20

## 2022-09-20 LAB
ALBUMIN SERPL BCG-MCNC: 4.5 G/DL (ref 3.5–5.2)
ALP SERPL-CCNC: 122 U/L (ref 40–129)
ALT SERPL W P-5'-P-CCNC: 70 U/L (ref 10–50)
ANION GAP SERPL CALCULATED.3IONS-SCNC: 8 MMOL/L (ref 7–15)
AST SERPL W P-5'-P-CCNC: 46 U/L (ref 10–50)
BILIRUB SERPL-MCNC: 0.8 MG/DL
BUN SERPL-MCNC: 21.5 MG/DL (ref 6–20)
CALCIUM SERPL-MCNC: 9.6 MG/DL (ref 8.6–10)
CHLORIDE SERPL-SCNC: 99 MMOL/L (ref 98–107)
CHOLEST SERPL-MCNC: 131 MG/DL
CREAT SERPL-MCNC: 0.94 MG/DL (ref 0.67–1.17)
DEPRECATED HCO3 PLAS-SCNC: 27 MMOL/L (ref 22–29)
GFR SERPL CREATININE-BSD FRML MDRD: >90 ML/MIN/1.73M2
GLUCOSE SERPL-MCNC: 93 MG/DL (ref 70–99)
HDLC SERPL-MCNC: 46 MG/DL
LDLC SERPL CALC-MCNC: 74 MG/DL
NONHDLC SERPL-MCNC: 85 MG/DL
POTASSIUM SERPL-SCNC: 4.1 MMOL/L (ref 3.4–5.3)
PROT SERPL-MCNC: 7.8 G/DL (ref 6.4–8.3)
PSA SERPL-MCNC: 0.67 NG/ML (ref 0–3.5)
SODIUM SERPL-SCNC: 134 MMOL/L (ref 136–145)
TRIGL SERPL-MCNC: 57 MG/DL

## 2022-09-20 PROCEDURE — 36415 COLL VENOUS BLD VENIPUNCTURE: CPT | Performed by: NURSE PRACTITIONER

## 2022-09-20 PROCEDURE — 99396 PREV VISIT EST AGE 40-64: CPT | Mod: 25 | Performed by: NURSE PRACTITIONER

## 2022-09-20 PROCEDURE — 80053 COMPREHEN METABOLIC PANEL: CPT | Performed by: NURSE PRACTITIONER

## 2022-09-20 PROCEDURE — G0103 PSA SCREENING: HCPCS | Performed by: NURSE PRACTITIONER

## 2022-09-20 PROCEDURE — 90682 RIV4 VACC RECOMBINANT DNA IM: CPT | Performed by: NURSE PRACTITIONER

## 2022-09-20 PROCEDURE — 80061 LIPID PANEL: CPT | Performed by: NURSE PRACTITIONER

## 2022-09-20 PROCEDURE — 90471 IMMUNIZATION ADMIN: CPT | Performed by: NURSE PRACTITIONER

## 2022-09-20 PROCEDURE — 99213 OFFICE O/P EST LOW 20 MIN: CPT | Mod: 25 | Performed by: NURSE PRACTITIONER

## 2022-09-20 RX ORDER — ROSUVASTATIN CALCIUM 10 MG/1
10 TABLET, COATED ORAL DAILY
Qty: 90 TABLET | Refills: 3 | Status: SHIPPED | OUTPATIENT
Start: 2022-09-20 | End: 2024-01-23

## 2022-09-20 RX ORDER — SILDENAFIL 100 MG/1
100 TABLET, FILM COATED ORAL DAILY PRN
Qty: 30 TABLET | Refills: 0 | Status: SHIPPED | OUTPATIENT
Start: 2022-09-20 | End: 2022-12-05

## 2022-09-20 RX ORDER — TADALAFIL 20 MG/1
20 TABLET ORAL DAILY PRN
Qty: 30 TABLET | Status: CANCELLED | OUTPATIENT
Start: 2022-09-20

## 2022-09-20 RX ORDER — METOPROLOL SUCCINATE 100 MG/1
100 TABLET, EXTENDED RELEASE ORAL DAILY
Qty: 90 TABLET | Refills: 3 | Status: SHIPPED | OUTPATIENT
Start: 2022-09-20 | End: 2023-12-12

## 2022-09-20 RX ORDER — LOSARTAN POTASSIUM AND HYDROCHLOROTHIAZIDE 25; 100 MG/1; MG/1
1 TABLET ORAL DAILY
Qty: 90 TABLET | Refills: 3 | Status: SHIPPED | OUTPATIENT
Start: 2022-09-20 | End: 2023-12-12

## 2022-09-20 RX ORDER — MECLIZINE HYDROCHLORIDE 25 MG/1
12.5-25 TABLET ORAL 3 TIMES DAILY PRN
Qty: 20 TABLET | Refills: 1 | Status: SHIPPED | OUTPATIENT
Start: 2022-09-20 | End: 2024-01-23

## 2022-09-20 ASSESSMENT — ENCOUNTER SYMPTOMS
ARTHRALGIAS: 1
DYSURIA: 0
NERVOUS/ANXIOUS: 1
COUGH: 0
CHILLS: 0
HEARTBURN: 0
SORE THROAT: 0
HEMATOCHEZIA: 0
EYE PAIN: 0
HEADACHES: 0
PALPITATIONS: 0
DIZZINESS: 1
FREQUENCY: 0
NAUSEA: 0
SHORTNESS OF BREATH: 0
CONSTIPATION: 0
DIARRHEA: 0
ABDOMINAL PAIN: 0
FEVER: 0
JOINT SWELLING: 0
HEMATURIA: 0
MYALGIAS: 0
WEAKNESS: 0
PARESTHESIAS: 0

## 2022-09-20 ASSESSMENT — PAIN SCALES - GENERAL: PAINLEVEL: EXTREME PAIN (8)

## 2022-09-20 NOTE — LETTER
September 21, 2022      Eh Barajas  [address] [facility] [id number]        Dear ,    We are writing to inform you of your test results.    Prostate cancer screen is normal.  Electrolytes, kidney function are fine.  Liver enzymes and cholesterol levels have improved because of your weight loss!    Resulted Orders   PSA, screen   Result Value Ref Range    Prostate Specific Antigen Screen 0.67 0.00 - 3.50 ng/mL    Narrative    This result is obtained using the Roche Elecsys total PSA method on the mery [id number] immunoassay analyzer. Results obtained with different assay methods or kits cannot be used interchangeably.   Lipid panel   Result Value Ref Range    Cholesterol 131 <200 mg/dL    Triglycerides 57 <150 mg/dL    Direct Measure HDL 46 >=40 mg/dL    LDL Cholesterol Calculated 74 <=100 mg/dL    Non HDL Cholesterol 85 <130 mg/dL    Narrative    Cholesterol  Desirable:  <200 mg/dL    Triglycerides  Normal:  Less than 150 mg/dL  Borderline High:  150-199 mg/dL  High:  200-499 mg/dL  Very High:  Greater than or equal to 500 mg/dL    Direct Measure HDL  Female:  Greater than or equal to 50 mg/dL   Male:  Greater than or equal to 40 mg/dL    LDL Cholesterol  Desirable:  <100mg/dL  Above Desirable:  100-129 mg/dL   Borderline High:  130-159 mg/dL   High:  160-189 mg/dL   Very High:  >= 190 mg/dL    Non HDL Cholesterol  Desirable:  130 mg/dL  Above Desirable:  130-159 mg/dL  Borderline High:  160-189 mg/dL  High:  190-219 mg/dL  Very High:  Greater than or equal to 220 mg/dL   Comprehensive metabolic panel (BMP + Alb, Alk Phos, ALT, AST, Total. Bili, TP)   Result Value Ref Range    Sodium 134 (L) 136 - 145 mmol/L    Potassium 4.1 3.4 - 5.3 mmol/L    Chloride 99 98 - 107 mmol/L    Carbon Dioxide (CO2) 27 22 - 29 mmol/L    Anion Gap 8 7 - 15 mmol/L    Urea Nitrogen 21.5 (H) 6.0 - 20.0 mg/dL    Creatinine 0.94 0.67 - 1.17 mg/dL    Calcium 9.6 8.6 - 10.0 mg/dL    Glucose 93 70 - 99 mg/dL    Alkaline Phosphatase  122 40 - 129 U/L    AST 46 10 - 50 U/L    ALT 70 (H) 10 - 50 U/L    Protein Total 7.8 6.4 - 8.3 g/dL    Albumin 4.5 3.5 - 5.2 g/dL    Bilirubin Total 0.8 <=1.2 mg/dL    GFR Estimate >90 >60 mL/min/1.73m2      Comment:      Effective December 21, 2021 eGFRcr in adults is calculated using the 2021 CKD-EPI creatinine equation which includes age and gender (Favian et al., NEJM, DOI: 10.1056/YGBHvj0919391)       If you have any questions or concerns, please call the clinic at the number listed above.       Sincerely,      Willian Wright NP

## 2022-09-20 NOTE — PATIENT INSTRUCTIONS
For the dizziness, I sent meclizine to your pharmacy.  You can take 1/2-1 tablet as needed.  Watch out for grogginess like we talked about.  If this is not helping, we can always get connected with a dizzy specialist.    Congratulations on the weight loss!  Keep up the good work.  If you do start to notice lightheadedness or wooziness, let me know and we may need to bring your blood pressure medicines down as your weight continues to decrease.    I suspect the numbness is related to aggravation/pinched nerves in the elbow/wrist.  Work on good formation while using your bicycle and consider wearing those wrist guards/compression elbow wraps.  If this continues though, we can always get you connected with orthopedics to look into injections.    Referral placed to podiatry for the flatfeet.    Paper prescription and coupon for the 100 mg Viagra provided today.  This can be brought to [facility].    Flu shot given today.      Preventive Health Recommendations  Male Ages 50 - 64    Yearly exam:             See your health care provider every year in order to  o   Review health changes.   o   Discuss preventive care.    o   Review your medicines if your doctor has prescribed any.   Have a cholesterol test every 5 years, or more frequently if you are at risk for high cholesterol/heart disease.   Have a diabetes test (fasting glucose) every three years. If you are at risk for diabetes, you should have this test more often.   Have a colonoscopy at age 50, or have a yearly FIT test (stool test). These exams will check for colon cancer.    Talk with your health care provider about whether or not a prostate cancer screening test (PSA) is right for you.  You should be tested each year for STDs (sexually transmitted diseases), if you re at risk.     Shots: Get a flu shot each year. Get a tetanus shot every 10 years.     Nutrition:  Eat at least 5 servings of fruits and vegetables daily.   Eat whole-grain bread, whole-wheat pasta and  brown rice instead of white grains and rice.   Get adequate Calcium and Vitamin D.     Lifestyle  Exercise for at least 150 minutes a week (30 minutes a day, 5 days a week). This will help you control your weight and prevent disease.   Limit alcohol to one drink per day.   No smoking.   Wear sunscreen to prevent skin cancer.   See your dentist every six months for an exam and cleaning.   See your eye doctor every 1 to 2 years.

## 2022-09-20 NOTE — PROGRESS NOTES
SUBJECTIVE:   CC: Eh is an 56 year old who presents for preventative health visit.     Daughter is getting  this Friday. This prompted him to want to lose weight. Has been dealing with vertigo. Room is spinning. No near syncope. Has been seen in  for this.     Hands are numb. Worse with biking but is happening while sleeping or working. No pain. Thinks he's getting a lot of hand numbness.     Umbilical hernia.  Not painful.  Present for years.  Seems smaller now that he is lost weight.    Patient has been advised of split billing requirements and indicates understanding: Yes  Healthy Habits:     Getting at least 3 servings of Calcium per day:  NO    Bi-annual eye exam:  NO    Dental care twice a year:  Yes    Sleep apnea or symptoms of sleep apnea:  Sleep apnea    Diet:  Carbohydrate counting    Frequency of exercise:  2-3 days/week    Duration of exercise:  15-30 minutes    Taking medications regularly:  Yes    Medication side effects:  None    PHQ-2 Total Score: 2    Additional concerns today:  Yes      Today's PHQ-2 Score:   PHQ-2 ( 1999 Pfizer) 9/20/2022   Q1: Little interest or pleasure in doing things 1   Q2: Feeling down, depressed or hopeless 1   PHQ-2 Score 2   Q1: Little interest or pleasure in doing things Several days   Q2: Feeling down, depressed or hopeless Several days   PHQ-2 Score 2       Abuse: Current or Past(Physical, Sexual or Emotional)- No  Do you feel safe in your environment? Yes        Social History     Tobacco Use     Smoking status: Never Smoker     Smokeless tobacco: Never Used   Substance Use Topics     Alcohol use: Not on file     If you drink alcohol do you typically have >3 drinks per day or >7 drinks per week? Yes    Alcohol Use 9/20/2022   Prescreen: >3 drinks/day or >7 drinks/week? Yes   AUDIT SCORE  7     AUDIT - Alcohol Use Disorders Identification Test - Reproduced from the World Health Organization Audit 2001 (Second Edition) 9/20/2022   1.  How often do you have a  drink containing alcohol? 2 to 3 times a week   2.  How many drinks containing alcohol do you have on a typical day when you are drinking? 3 or 4   3.  How often do you have five or more drinks on one occasion? Weekly   4.  How often during the last year have you found that you were not able to stop drinking once you had started? Never   5.  How often during the last year have you failed to do what was normally expected of you because of drinking? Never   6.  How often during the last year have you needed a first drink in the morning to get yourself going after a heavy drinking session? Never   7.  How often during the last year have you had a feeling of guilt or remorse after drinking? Never   8.  How often during the last year have you been unable to remember what happened the night before because of your drinking? Never   9.  Have you or someone else been injured because of your drinking? No   10. Has a relative, friend, doctor or other health care worker been concerned about your drinking or suggested you cut down? No   TOTAL SCORE 7       Last PSA:   Prostate Specific Antigen Screen   Date Value Ref Range Status   08/12/2021 0.62 0.00 - 3.50 ug/L Final       Reviewed orders with patient. Reviewed health maintenance and updated orders accordingly - Yes  Lab work is in process    Reviewed and updated as needed this visit by clinical staff   Tobacco  Allergies  Meds                Reviewed and updated as needed this visit by Provider                   No past medical history on file.   Past Surgical History:   Procedure Laterality Date     KNEE ARTHROSCOPY W/ DEBRIDEMENT         Review of Systems   Constitutional: Negative for chills and fever.   HENT: Positive for hearing loss. Negative for congestion, ear pain and sore throat.    Eyes: Negative for pain and visual disturbance.   Respiratory: Negative for cough and shortness of breath.    Cardiovascular: Negative for chest pain, palpitations and peripheral  "edema.   Gastrointestinal: Negative for abdominal pain, constipation, diarrhea, heartburn, hematochezia and nausea.   Genitourinary: Negative for dysuria, frequency, genital sores, hematuria and urgency.   Musculoskeletal: Positive for arthralgias. Negative for joint swelling and myalgias.   Skin: Negative for rash.   Neurological: Positive for dizziness. Negative for weakness, headaches and paresthesias.   Psychiatric/Behavioral: Negative for mood changes. The patient is nervous/anxious.        OBJECTIVE:   /78 (BP Location: Right arm, Patient Position: Sitting, Cuff Size: Adult Large)   Pulse 67   Temp 98.5  F (36.9  C)   Ht 1.784 m (5' 10.25\")   Wt 116 kg (255 lb 11.2 oz)   SpO2 98%   BMI 36.43 kg/m      Physical Exam  GENERAL: healthy, alert and no distress  EYES: Eyes grossly normal to inspection, PERRL and conjunctivae and sclerae normal  HENT: ear canals and TM's normal, nose and mouth without ulcers or lesions  NECK: no adenopathy, no asymmetry, masses, or scars and thyroid normal to palpation  RESP: lungs clear to auscultation - no rales, rhonchi or wheezes  CV: regular rate and rhythm, normal S1 S2, no S3 or S4, no murmur, click or rub, no peripheral edema and peripheral pulses strong  ABDOMEN: soft, nontender, no hepatosplenomegaly, no masses and bowel sounds normal.  Small umbilical hernia.  MS: no gross musculoskeletal defects noted, no edema  SKIN: no suspicious lesions or rashes  NEURO: Normal strength and tone, mentation intact and speech normal  PSYCH: mentation appears normal, affect normal/bright    Diagnostic Test Results:  Labs reviewed in Epic    ASSESSMENT/PLAN:       ICD-10-CM    1. Routine general medical examination at a health care facility  Z00.00    2. Drug-induced erectile dysfunction  N52.2 sildenafil (VIAGRA) 100 MG tablet   3. Hyperlipidemia, unspecified hyperlipidemia type  E78.5 Lipid panel     Comprehensive metabolic panel (BMP + Alb, Alk Phos, ALT, AST, Total. Bili, " "TP)     rosuvastatin (CRESTOR) 10 MG tablet   4. Hypertension with goal blood pressure less than 140/90  I10 Comprehensive metabolic panel (BMP + Alb, Alk Phos, ALT, AST, Total. Bili, TP)     metoprolol succinate ER (TOPROL XL) 100 MG 24 hr tablet     losartan-hydrochlorothiazide (HYZAAR) 100-25 MG tablet   5. Obesity (BMI 35.0-39.9) with comorbidity (H)  E66.01    6. Umbilical hernia without obstruction and without gangrene  K42.9    7. Cubital tunnel syndrome, unspecified laterality  G56.20    8. Needs flu shot  Z23 INFLUENZA QUAD, RECOMBINANT, P-FREE (RIV4) (FLUBLOK) AGE 50-64 [VNA244]   9. Screening for prostate cancer  Z12.5 PSA, screen   10. Vertigo  R42 meclizine (ANTIVERT) 25 MG tablet   11. Flat feet  M21.41 Orthopedic  Referral    M21.42      Trial of meclizine for intermittent vertigo symptoms.  No red flags on exam.  Lower suspicion for Ménière's.  If still present, get connected with dizzy specialist.  Paper prescription for sildenafil provided along with coupon.  This seemed to have worked better than the Cialis.  Reviewed potential side effects.  Flu shot given.  Podiatry referral placed for flat feet.  Continue same antihypertensives but if getting hypotensive as he continues to lose weight, let me know and we will cut these back.  Update screening labs.  Colonoscopy due in 2026.  Declines bivalent COVID-vaccine today.  Plans to get this fall.    COUNSELING:   Reviewed preventive health counseling, as reflected in patient instructions    Estimated body mass index is 36.43 kg/m  as calculated from the following:    Height as of this encounter: 1.784 m (5' 10.25\").    Weight as of this encounter: 116 kg (255 lb 11.2 oz).     Weight management plan: Discussed healthy diet and exercise guidelines    He reports that he has never smoked. He has never used smokeless tobacco.      Counseling Resources:  ATP IV Guidelines  Pooled Cohorts Equation Calculator  FRAX Risk Assessment  ICSI Preventive " Guidelines  Dietary Guidelines for Americans, 2010  USDA's MyPlate  ASA Prophylaxis  Lung CA Screening    Willian Wright NP  [facility]

## 2022-10-05 ENCOUNTER — OFFICE VISIT (OUTPATIENT)
Dept: PODIATRY | Facility: CLINIC | Age: 57
End: 2022-10-05
Attending: NURSE PRACTITIONER
Payer: COMMERCIAL

## 2022-10-05 VITALS
HEART RATE: 74 BPM | WEIGHT: 255 LBS | BODY MASS INDEX: 36.33 KG/M2 | OXYGEN SATURATION: 97 % | RESPIRATION RATE: 16 BRPM

## 2022-10-05 DIAGNOSIS — M62.40 CONTRACTED, TENDON: ICD-10-CM

## 2022-10-05 DIAGNOSIS — M21.6X2 PRONATION DEFORMITY OF BOTH FEET: Primary | ICD-10-CM

## 2022-10-05 DIAGNOSIS — M21.6X1 PRONATION DEFORMITY OF BOTH FEET: Primary | ICD-10-CM

## 2022-10-05 PROCEDURE — 99243 OFF/OP CNSLTJ NEW/EST LOW 30: CPT | Performed by: PODIATRIST

## 2022-10-05 RX ORDER — ALBUTEROL SULFATE 90 UG/1
AEROSOL, METERED RESPIRATORY (INHALATION)
COMMUNITY
Start: 2021-12-24 | End: 2024-01-23

## 2022-10-05 RX ORDER — SERTRALINE HYDROCHLORIDE 25 MG/1
25 TABLET, FILM COATED ORAL AT BEDTIME
COMMUNITY
Start: 2022-09-28 | End: 2024-01-23

## 2022-10-05 RX ORDER — DIAZEPAM 5 MG
TABLET ORAL
COMMUNITY
Start: 2022-06-21 | End: 2024-01-23

## 2022-10-05 RX ORDER — ASPIRIN 81 MG/1
243 TABLET, CHEWABLE ORAL
COMMUNITY

## 2022-10-05 RX ORDER — CYCLOBENZAPRINE HCL 10 MG
TABLET ORAL
COMMUNITY
Start: 2022-06-21 | End: 2024-01-23

## 2022-10-05 ASSESSMENT — PAIN SCALES - GENERAL: PAINLEVEL: EXTREME PAIN (8)

## 2022-10-05 NOTE — LETTER
10/5/2022         RE: Randal Barajas  [address] [address]        Dear Colleague,    Thank you for referring your patient, Randal Barajas, to the [facility]. Please see a copy of my visit note below.    FOOT AND ANKLE SURGERY/PODIATRY CONSULT NOTE         ASSESSMENT:   Pronation deformity bilateral feet  Contracted Achilles tendon bilaterally      TREATMENT:  I have recommended prescription orthotics.  The patient was asked to return to clinic as needed.  I informed the patient that after wearing his orthotics consistently if he continues to have discomfort he may require surgical intervention in the form of a gastrocnemius recession with a talonavicular arthrodesis and possible cotton procedure of the left foot.        HPI: I was asked to see Randal Barajas today to evaluate and treat bilateral foot pain.  The patient indicated that he has had problems with his feet for the past 25 years.  He has a history of wearing orthotics.  He has not had a new pair of orthotics for the past 25 years.  The patient stated that he has been diagnosed with severe flat feet.  He wants to remain active.  He continues to have pain in the arches of both feet.  He has lost 50 pounds over the past year and he would like to continue to participate in his activities without discomfort.  He describes the pain as a aching type pain which is partially relieved with nonweightbearing.  He denies trauma to his feet.  He has not had any associated redness or swelling..  The patient was seen in consultation at the request of SHAVON Dorsey for evaluation and treatment of bilateral foot pain.     Past Medical History:   Diagnosis Date     Pulmonary nodule 2/20/2017    Identified 11/30/2015 on CTA--needs one year follow-up        Social History     Socioeconomic History     Marital status:      Spouse name: Not on file     Number of children: Not on file     Years of education: Not on  file     Highest education level: Not on file   Occupational History     Not on file   Tobacco Use     Smoking status: Never Smoker     Smokeless tobacco: Never Used   Substance and Sexual Activity     Alcohol use: Not on file     Drug use: Never     Sexual activity: Not on file   Other Topics Concern     Parent/sibling w/ CABG, MI or angioplasty before 65F 55M? Not Asked   Social History Narrative     Not on file     Social Determinants of Health     Financial Resource Strain: Not on file   Food Insecurity: Not on file   Transportation Needs: Not on file   Physical Activity: Not on file   Stress: Not on file   Social Connections: Not on file   Intimate Partner Violence: Not on file   Housing Stability: Not on file          Allergies   Allergen Reactions     Lisinopril Cough     Atorvastatin Itching          Current Outpatient Medications:      albuterol (PROAIR HFA/PROVENTIL HFA/VENTOLIN HFA) 108 (90 Base) MCG/ACT inhaler, INHALE 1 TO 2 PUFFS BY MOUTH EVERY 4 HOURS AS NEEDED FOR WHEEZING, Disp: , Rfl:      aspirin (ASA) 81 MG chewable tablet, Take 243 mg by mouth, Disp: , Rfl:      cyclobenzaprine (FLEXERIL) 10 MG tablet, , Disp: , Rfl:      diazepam (VALIUM) 5 MG tablet, , Disp: , Rfl:      losartan-hydrochlorothiazide (HYZAAR) 100-25 MG tablet, Take 1 tablet by mouth daily, Disp: 90 tablet, Rfl: 3     meclizine (ANTIVERT) 25 MG tablet, Take 0.5-1 tablets (12.5-25 mg) by mouth 3 times daily as needed for dizziness, Disp: 20 tablet, Rfl: 1     metoprolol succinate ER (TOPROL XL) 100 MG 24 hr tablet, Take 1 tablet (100 mg) by mouth daily, Disp: 90 tablet, Rfl: 3     rosuvastatin (CRESTOR) 10 MG tablet, Take 1 tablet (10 mg) by mouth daily, Disp: 90 tablet, Rfl: 3     sertraline (ZOLOFT) 25 MG tablet, Take 25 mg by mouth At Bedtime, Disp: , Rfl:      sildenafil (VIAGRA) 100 MG tablet, Take 1 tablet (100 mg) by mouth daily as needed, Disp: 30 tablet, Rfl: 0     No family history on file.     Social History      Socioeconomic History     Marital status:      Spouse name: Not on file     Number of children: Not on file     Years of education: Not on file     Highest education level: Not on file   Occupational History     Not on file   Tobacco Use     Smoking status: Never Smoker     Smokeless tobacco: Never Used   Substance and Sexual Activity     Alcohol use: Not on file     Drug use: Never     Sexual activity: Not on file   Other Topics Concern     Parent/sibling w/ CABG, MI or angioplasty before 65F 55M? Not Asked   Social History Narrative     Not on file     Social Determinants of Health     Financial Resource Strain: Not on file   Food Insecurity: Not on file   Transportation Needs: Not on file   Physical Activity: Not on file   Stress: Not on file   Social Connections: Not on file   Intimate Partner Violence: Not on file   Housing Stability: Not on file        Review of Systems - Patient denies fever, chills, rash, wound, stiffness, limping, numbness, weakness, heart burn, blood in stool, chest pain with activity, calf pain when walking, shortness of breath with activity, chronic cough, easy bleeding/bruising, swelling of ankles, excessive thirst, fatigue, depression, anxiety.  Patient admits to bilateral foot pain.      OBJECTIVE:  Appearance: alert, well appearing, and in no distress.    There were no vitals taken for this visit.     There is no height or weight on file to calculate BMI.     General appearance: Patient is alert and fully cooperative with history & exam.  No sign of distress is noted during the visit.  Psychiatric: Affect is pleasant & appropriate.  Patient appears motivated to improve health.  Respiratory: Breathing is regular & unlabored while sitting.  HEENT: Hearing is intact to spoken word.  Speech is clear.  No gross evidence of visual impairment that would impact ambulation.    Vascular: Dorsalis pedis and posterior tibial pulses are palpable. There is no pedal hair growth  bilaterally.  CFT < 3 sec from anterior tibial surface to distal digits bilaterally. There is no appreciable edema noted.  Dermatologic: Turgor and texture are within normal limits. No coloration or temperature changes. No primary or secondary lesions noted.  Neurologic: All epicritic and proprioceptive sensations are grossly intact bilaterally.  Musculoskeletal: All active and passive ankle, subtalar, midtarsal, and 1st MPJ range of motion are grossly intact without pain or crepitus, with the exception of none. Manual muscle strength is within normal limits bilaterally. All dorsiflexors, plantarflexors, invertors, evertors are intact bilaterally.  Moderate tenderness present to the medial longitudinal arch bilaterally on palpation.  No tenderness to bilateral feet or ankles with range of motion.  Severe flattening of the medial longitudinal arch noted bilaterally.  There is medial ptosis of the talar head left foot.  There is significant decrease in the amount of dorsiflexion available at both ankles when the feet are maximally dorsiflexed where the legs are extended.  Calf is soft/non-tender without warmth/induration    Imaging:       No images are attached to the encounter or orders placed in the encounter.     No results found.   No results found.       Randal Knight DPM  [facility] Foot & Ankle Surgery/Podiatry         Again, thank you for allowing me to participate in the care of your patient.        Sincerely,        Randal Mathis DPM

## 2022-10-05 NOTE — PATIENT INSTRUCTIONS
What are Prescription Custom Orthotics?  Custom orthotics are specially-made devices designed to support and comfort your feet. Prescription orthotics are crafted for you and no one else. They match the contours of your feet precisely and are designed for the way you move. Orthotics are only manufactured after a podiatrist has conducted a complete evaluation of your feet, ankles, and legs, so the orthotic can accommodate your unique foot structure and pathology.  Prescription orthotics are divided into two categories:  Functional orthotics are designed to control abnormal motion. They may be used to treat foot pain caused by abnormal motion; they can also be used to treat injuries such as shin splints or tendinitis. Functional orthotics are usually crafted of a semi-rigid material such as plastic or graphite.  Accommodative orthotics are softer and meant to provide additional cushioning and support. They can be used to treat diabetic foot ulcers, painful calluses on the bottom of the foot, and other uncomfortable conditions.  Podiatrists use orthotics to treat foot problems such as plantar fasciitis, bursitis, tendinitis, diabetic foot ulcers, and foot, ankle, and heel pain. Clinical research studies have shown that podiatrist-prescribed foot orthotics decrease foot pain and improve function.  Orthotics typically cost more than shoe inserts purchased in a retail store, but the additional cost is usually well worth it. Unlike shoe inserts, orthotics are molded to fit each individual foot, so you can be sure that your orthotics fit and do what they're supposed to do. Prescription orthotics are also made of top-notch materials and last many years when cared for properly. Insurance often helps pay for prescription orthotics.  What are Shoe Inserts?   You've seen them at the grocery store and at the mall. You've probably even seen them on TV and online. Shoe inserts are any kind of non-prescription foot support designed  to be worn inside a shoe. Pre-packaged, mass produced, arch supports are shoe inserts. So are the  custom-made  insoles and foot supports that you can order online or at retail stores. Unless the device has been prescribed by a doctor and crafted for your specific foot, it's a shoe insert, not a custom orthotic device--despite what the ads might say.  Shoe inserts can be very helpful for a variety of foot ailments, including flat arches and foot and leg pain. They can cushion your feet, provide comfort, and support your arches, but they can't correct biomechanical foot problems or cure long-standing foot issues.  The most common types of shoe inserts are:  Arch supports: Some people have high arches. Others have low arches or flat feet. Arch supports generally have a  bumped-up  appearance and are designed to support the foot's natural arch.   Insoles: Insoles slip into your shoe to provide extra cushioning and support. Insoles are often made of gel, foam, or plastic.   Heel liners: Heel liners, sometimes called heel pads or heel cups, provide extra cushioning in the heel region. They may be especially useful for patients who have foot pain caused by age-related thinning of the heels' natural fat pads.   Foot cushions: Do your shoes rub against your heel or your toes? Foot cushions come in many different shapes and sizes and can be used as a barrier between you and your shoe.  Choosing an Over-the-Counter Shoe Insert  Selecting a shoe insert from the wide variety of devices on the market can be overwhelming. Here are some podiatrist-tested tips to help you find the insert that best meets your needs:  Consider your health. Do you have diabetes? Problems with circulation? An over-the-counter insert may not be your best bet. Diabetes and poor circulation increase your risk of foot ulcers and infections, so schedule an appointment with a podiatrist. He or she can help you select a solution that won't cause additional  health problems.   Think about the purpose. Are you planning to run a marathon, or do you just need a little arch support in your work shoes? Look for a product that fits your planned level of activity.   Bring your shoes. For the insert to be effective, it has to fit into your shoes. So bring your sneakers, dress shoes, or work boots--whatever you plan to wear with your insert. Look for an insert that will fit the contours of your shoe.   Try them on. If all possible, slip the insert into your shoe and try it out. Walk around a little. How does it feel? Don't assume that feelings of pressure will go away with continued wear. (If you can't try the inserts at the store, ask about the store's return policy and hold on to your receipt.)    Please call one of the [facility] locations below to schedule an appointment. If you received a prescription please bring it with you to your appointment. Some locations are limited to what they carry.    Office Locations    [facility]  [address] [place] [address]  Home Medical Equipment, [address]   Phone: [phone]   Orthotics and Prosthetics, [address]   Phone: [phone]    [facility]  [address]  [address]   [place] [address]   Phone: [phone]    [facility]  [address]  [place] [address]  Phone: [phone]    [facility]   [address]  [place] [address]  Phone: [phone]    [facility]  [address]  [place] [address]  Phone: [phone]    [facility]  [address]  [place] [address]  Phone: [phone]    [place]   [address] [place] [address]   Phone: [phone]    WEARING YOUR CUSTOM FOOT ORTHOTICS   Most  insurance plans cover one pair of orthotics per year. You must check with your   insurance plan to see what your payment responsibility will be. Please call your   insurance company by calling the number on the back of your insurance card.   Orthotic's are non-refundable and non-returnable.   Orthotics are made of various designs. Some orthotics are covered with material that extends beyond your toes. If your orthotic is of this design, you will likely need to trim the toe end to get a proper fit. The insole from your shoe can be used as a template. Simply overlay the shoe insert on top of the custom orthotic. Align the heel end while tracing the length of the insert onto the custom orthotic. Use a large scissor to trim the toe end until you get a proper fit in the shoe.   The orthotic needs to be pushed as far back in the shoe as possible. The heel portion should not ride forward so as not to irritate your heel.   Orthotics are designed to work with socks. Excessive perspiration will shorten the life span of the orthotics. Remove the orthotic from the shoe frequently for proper drying.   The break-in period lasts for weeks. People new to orthotics will likely experience new aches and pains. The orthotic is forcing your foot into a new position. Arch, foot and leg muscle aches and fatigue are common during these weeks. Minor discomfort can be considered normal break in phenomenon. Start wearing your orthotic around your home your first day. Limited activity for one to two hours is recommended. You can increase one or two additional hours each day provided the aches and pains are subsiding. The degree of discomfort, fatigue and problems will dictate the speed of break in. You may require multiple weeks to work up to full time use.   Do not continue wearing your orthotics if they are creating problems such as blisters or sores. Do not hesitate to call the clinic to speak with a nurse regarding orthotic   break in,  fit, trimming, etc. You may also need to see the doctor if the orthotics are   simply not working out. Adjustments are sometimes made to improve orthotic   function.     Orthotics will only work in certain styles and types of shoes. Orthotics rarely work in dress shoes. Slip-ons, clogs, sandals and heels are particularly troublesome. Specially designed orthotics may be necessary for these types of shoes. Your custom orthotic was designed for activities that require appropriate walking or running shoes. Lace up athletic shoes, walking shoes or work boots should work appropriately. You may need a wider or longer shoe. Shoes with a removable  or insert work best. In general, you want to remove an insert from the shoe before placing the orthotic into the shoe. Shoes without a removable liner may not work as well.     When purchasing new shoes, bring your orthotics along to get a proper fit. Shop at stores that are familiar with orthotics.   Frequent washing of the orthotic may shorten the life span of the top cover. The top cover can be replaced but will generally last one to five years depending on use and foot perspiration.

## 2022-10-05 NOTE — PROGRESS NOTES
FOOT AND ANKLE SURGERY/PODIATRY CONSULT NOTE         ASSESSMENT:   Pronation deformity bilateral feet  Contracted Achilles tendon bilaterally      TREATMENT:  I have recommended prescription orthotics.  The patient was asked to return to clinic as needed.  I informed the patient that after wearing his orthotics consistently if he continues to have discomfort he may require surgical intervention in the form of a gastrocnemius recession with a talonavicular arthrodesis and possible cotton procedure of the left foot.        HPI: I was asked to see Randal Barajas today to evaluate and treat bilateral foot pain.  The patient indicated that he has had problems with his feet for the past 25 years.  He has a history of wearing orthotics.  He has not had a new pair of orthotics for the past 25 years.  The patient stated that he has been diagnosed with severe flat feet.  He wants to remain active.  He continues to have pain in the arches of both feet.  He has lost 50 pounds over the past year and he would like to continue to participate in his activities without discomfort.  He describes the pain as a aching type pain which is partially relieved with nonweightbearing.  He denies trauma to his feet.  He has not had any associated redness or swelling..  The patient was seen in consultation at the request of SHAVON Dorsey for evaluation and treatment of bilateral foot pain.     Past Medical History:   Diagnosis Date     Pulmonary nodule 2/20/2017    Identified 11/30/2015 on CTA--needs one year follow-up        Social History     Socioeconomic History     Marital status:      Spouse name: Not on file     Number of children: Not on file     Years of education: Not on file     Highest education level: Not on file   Occupational History     Not on file   Tobacco Use     Smoking status: Never Smoker     Smokeless tobacco: Never Used   Substance and Sexual Activity     Alcohol use: Not on file     Drug use: Never     Sexual  activity: Not on file   Other Topics Concern     Parent/sibling w/ CABG, MI or angioplasty before 65F 55M? Not Asked   Social History Narrative     Not on file     Social Determinants of Health     Financial Resource Strain: Not on file   Food Insecurity: Not on file   Transportation Needs: Not on file   Physical Activity: Not on file   Stress: Not on file   Social Connections: Not on file   Intimate Partner Violence: Not on file   Housing Stability: Not on file          Allergies   Allergen Reactions     Lisinopril Cough     Atorvastatin Itching          Current Outpatient Medications:      albuterol (PROAIR HFA/PROVENTIL HFA/VENTOLIN HFA) 108 (90 Base) MCG/ACT inhaler, INHALE 1 TO 2 PUFFS BY MOUTH EVERY 4 HOURS AS NEEDED FOR WHEEZING, Disp: , Rfl:      aspirin (ASA) 81 MG chewable tablet, Take 243 mg by mouth, Disp: , Rfl:      cyclobenzaprine (FLEXERIL) 10 MG tablet, , Disp: , Rfl:      diazepam (VALIUM) 5 MG tablet, , Disp: , Rfl:      losartan-hydrochlorothiazide (HYZAAR) 100-25 MG tablet, Take 1 tablet by mouth daily, Disp: 90 tablet, Rfl: 3     meclizine (ANTIVERT) 25 MG tablet, Take 0.5-1 tablets (12.5-25 mg) by mouth 3 times daily as needed for dizziness, Disp: 20 tablet, Rfl: 1     metoprolol succinate ER (TOPROL XL) 100 MG 24 hr tablet, Take 1 tablet (100 mg) by mouth daily, Disp: 90 tablet, Rfl: 3     rosuvastatin (CRESTOR) 10 MG tablet, Take 1 tablet (10 mg) by mouth daily, Disp: 90 tablet, Rfl: 3     sertraline (ZOLOFT) 25 MG tablet, Take 25 mg by mouth At Bedtime, Disp: , Rfl:      sildenafil (VIAGRA) 100 MG tablet, Take 1 tablet (100 mg) by mouth daily as needed, Disp: 30 tablet, Rfl: 0     No family history on file.     Social History     Socioeconomic History     Marital status:      Spouse name: Not on file     Number of children: Not on file     Years of education: Not on file     Highest education level: Not on file   Occupational History     Not on file   Tobacco Use     Smoking status:  Never Smoker     Smokeless tobacco: Never Used   Substance and Sexual Activity     Alcohol use: Not on file     Drug use: Never     Sexual activity: Not on file   Other Topics Concern     Parent/sibling w/ CABG, MI or angioplasty before 65F 55M? Not Asked   Social History Narrative     Not on file     Social Determinants of Health     Financial Resource Strain: Not on file   Food Insecurity: Not on file   Transportation Needs: Not on file   Physical Activity: Not on file   Stress: Not on file   Social Connections: Not on file   Intimate Partner Violence: Not on file   Housing Stability: Not on file        Review of Systems - Patient denies fever, chills, rash, wound, stiffness, limping, numbness, weakness, heart burn, blood in stool, chest pain with activity, calf pain when walking, shortness of breath with activity, chronic cough, easy bleeding/bruising, swelling of ankles, excessive thirst, fatigue, depression, anxiety.  Patient admits to bilateral foot pain.      OBJECTIVE:  Appearance: alert, well appearing, and in no distress.    There were no vitals taken for this visit.     There is no height or weight on file to calculate BMI.     General appearance: Patient is alert and fully cooperative with history & exam.  No sign of distress is noted during the visit.  Psychiatric: Affect is pleasant & appropriate.  Patient appears motivated to improve health.  Respiratory: Breathing is regular & unlabored while sitting.  HEENT: Hearing is intact to spoken word.  Speech is clear.  No gross evidence of visual impairment that would impact ambulation.    Vascular: Dorsalis pedis and posterior tibial pulses are palpable. There is no pedal hair growth bilaterally.  CFT < 3 sec from anterior tibial surface to distal digits bilaterally. There is no appreciable edema noted.  Dermatologic: Turgor and texture are within normal limits. No coloration or temperature changes. No primary or secondary lesions noted.  Neurologic: All  epicritic and proprioceptive sensations are grossly intact bilaterally.  Musculoskeletal: All active and passive ankle, subtalar, midtarsal, and 1st MPJ range of motion are grossly intact without pain or crepitus, with the exception of none. Manual muscle strength is within normal limits bilaterally. All dorsiflexors, plantarflexors, invertors, evertors are intact bilaterally.  Moderate tenderness present to the medial longitudinal arch bilaterally on palpation.  No tenderness to bilateral feet or ankles with range of motion.  Severe flattening of the medial longitudinal arch noted bilaterally.  There is medial ptosis of the talar head left foot.  There is significant decrease in the amount of dorsiflexion available at both ankles when the feet are maximally dorsiflexed where the legs are extended.  Calf is soft/non-tender without warmth/induration    Imaging:       No images are attached to the encounter or orders placed in the encounter.     No results found.   No results found.       Randal Knight DPM  [facility] Foot & Ankle Surgery/Podiatry

## 2022-11-17 DIAGNOSIS — E78.5 HYPERLIPIDEMIA, UNSPECIFIED HYPERLIPIDEMIA TYPE: ICD-10-CM

## 2022-11-17 DIAGNOSIS — I10 HYPERTENSION WITH GOAL BLOOD PRESSURE LESS THAN 140/90: ICD-10-CM

## 2022-11-18 RX ORDER — ROSUVASTATIN CALCIUM 10 MG/1
TABLET, COATED ORAL
Qty: 90 TABLET | Refills: 3 | OUTPATIENT
Start: 2022-11-18

## 2022-11-18 RX ORDER — LOSARTAN POTASSIUM AND HYDROCHLOROTHIAZIDE 25; 100 MG/1; MG/1
1 TABLET ORAL DAILY
Qty: 90 TABLET | Refills: 3 | OUTPATIENT
Start: 2022-11-18

## 2022-12-04 DIAGNOSIS — N52.2 DRUG-INDUCED ERECTILE DYSFUNCTION: ICD-10-CM

## 2022-12-05 RX ORDER — SILDENAFIL 100 MG/1
TABLET, FILM COATED ORAL
Qty: 30 TABLET | Refills: 4 | Status: SHIPPED | OUTPATIENT
Start: 2022-12-05 | End: 2024-01-15

## 2023-02-10 ENCOUNTER — NURSE TRIAGE (OUTPATIENT)
Dept: FAMILY MEDICINE | Facility: CLINIC | Age: 58
End: 2023-02-10
Payer: COMMERCIAL

## 2023-02-10 NOTE — TELEPHONE ENCOUNTER
Pt called in requesting covid treatment, pt is on day 3 of symptoms. (started on 2/7). Advised someone would call him back and go through the protocol with him.

## 2023-02-10 NOTE — TELEPHONE ENCOUNTER
Nurse Triage SBAR    Is this a 2nd Level Triage? NO    Situation:   Positive for COVID today with symptom onset on 2/7/23.    Background:   Age 57  GFR >90 on 9/20/22.    No know influenza exposure.    Elevated ALT on 8/12/21 and 9/20/22  Elevated Alk Phos on 8/12/21; WNL on 9/20/22.    Wt 116 kg (255 lb 11.2 oz) with BMI 36.43 kg/m  from OV 9/20/22-still around 255 lb per pt.    Viagra- needs to see provider virtually for treatment    Crestor 10 mg daily- need to hold while taking Paxlovid; can resume day 1 after completion of Paxlovid.    Diazepam daily or PRN?- taking it daily per pt.      Assessment:   Fever or chills- yes to both, fever of 101; no fever today.  Cough-yes  SOB/Difficulty breathing-no  Fatigue-yes  Muscle/body aches-yes  HA-yes, motrin  ST- no  Congestion/runny nose- yes  N/V/D-no  Chest pain-from coughing  Ears are hurting  Eyes are burning       Protocol Recommended Disposition:   No disposition on file.    Recommendation:   Care advices given. Initiated RN COVID-19 TX Protocol but unable to complete d/t elevated ALT and Alk Phos and pt is taking Diazepam daily, Viagra, and Crestor. Assisted with scheduling Virtual Visit for pt for tomorrow morning.    Assisted with scheduling virtual visit.    Does the patient meet one of the following criteria for ADS visit consideration? 16+ years old, with an [facility] PCP     TIP  Providers, please consider if this condition is appropriate for management at one of our Acute and Diagnostic Services sites.     If patient is a good candidate, please use dotphrase <dot>triageresponse and select Refer to ADS to document.    Reason for Disposition    [1] HIGH RISK for severe COVID complications (e.g., weak immune system, age > 64 years, obesity with BMI of 30 or higher, pregnant, chronic lung disease or other chronic medical condition) AND [2] COVID symptoms (e.g., cough, fever)  (Exceptions: Already seen by PCP and no new or worsening symptoms.)    Additional  Information    Negative: SEVERE difficulty breathing (e.g., struggling for each breath, speaks in single words)    Negative: Difficult to awaken or acting confused (e.g., disoriented, slurred speech)    Negative: Bluish (or gray) lips or face now    Negative: Shock suspected (e.g., cold/pale/clammy skin, too weak to stand, low BP, rapid pulse)    Negative: Sounds like a life-threatening emergency to the triager    Negative: [1] Diagnosed or suspected COVID-19 AND [2] symptoms lasting 3 or more weeks    Negative: [1] COVID-19 exposure AND [2] no symptoms    Negative: COVID-19 vaccine reaction suspected (e.g., fever, headache, muscle aches) occurring 1 to 3 days after getting vaccine    Negative: COVID-19 vaccine, questions about    Negative: [1] Lives with someone known to have influenza (flu test positive) AND [2] flu-like symptoms (e.g., cough, runny nose, sore throat, SOB; with or without fever)    Negative: [1] Adult with possible COVID-19 symptoms AND [2] triager concerned about severity of symptoms or other causes    Negative: COVID-19 and breastfeeding, questions about    Negative: SEVERE or constant chest pain or pressure  (Exception: Mild central chest pain, present only when coughing.)    Negative: MODERATE difficulty breathing (e.g., speaks in phrases, SOB even at rest, pulse 100-120)    Negative: Headache and stiff neck (can't touch chin to chest)    Negative: Oxygen level (e.g., pulse oximetry) 90 percent or lower    Negative: Chest pain or pressure  (Exception: MILD central chest pain, present only when coughing)    Negative: Patient sounds very sick or weak to the triager    Negative: MILD difficulty breathing (e.g., minimal/no SOB at rest, SOB with walking, pulse <100)    Negative: Fever > 103 F (39.4 C)    Negative: [1] Fever > 101 F (38.3 C) AND [2] over 60 years of age    Negative: [1] Fever > 100.0 F (37.8 C) AND [2] bedridden (e.g., nursing home patient, CVA, chronic illness, recovering from  surgery)    Protocols used: CORONAVIRUS (COVID-19) DIAGNOSED OR [id number]-S-XK      RN COVID TREATMENT VISIT  02/10/23    Randal Barajas  57 year old  Current weight? 255 lb.    Has the patient been seen by a primary care provider at an [facility] or [facility] within the past two years? Yes.   Have you been in close proximity to/do you have a known exposure to a person with a confirmed case of influenza? No.     Date of positive COVID test (PCR or at home)?  2/10/23    Current COVID symptoms: fever or chills, cough, fatigue, muscle or body aches, headache and congestion or runny nose    Date COVID symptoms began: 2/7/23    Do you have any of the following conditions that place you at risk of being very sick from COVID-19? overweight (BMI>25)    Is patient eligible to continue? Yes, established patient, 12 years or older weighing at least 88.2 lbs, who has COVID symptoms that started in the past 5 days and is at risk for being very sick from COVID-19.       Have you received monoclonal antibodies or oral antiviral medications since testing positive to COVID-19? No    Are you currently hospitalized for COVID-19? No    Do you have a history of hepatitis? YES    Are you currently pregnant or nursing? No    Do you have a clinically significant hypersensitivity to nirmatrelvir, ritonavir, or molnupiravir? No    Do you have any history of severe renal impairment (eGFR < 30mL/min)? No    Do you have any history of hepatic impairment or abnormalities (e.g. hepatic panel, ALT, AST, ALK Phos, bilirubin)? YES    Have you had a coronary stent placed in the previous 6 months? No    Is patient eligible to continue? No, patient does not meet all eligibility requirements for the RN COVID treatment (as denoted by yes response(s) above). Patient informed they will need a virtual provider visit to assess treatment options.     ASHIA LiangN, RN  [facility]

## 2023-02-11 ENCOUNTER — VIRTUAL VISIT (OUTPATIENT)
Dept: URGENT CARE | Facility: CLINIC | Age: 58
End: 2023-02-11
Payer: COMMERCIAL

## 2023-02-11 DIAGNOSIS — U07.1 INFECTION DUE TO 2019 NOVEL CORONAVIRUS: Primary | ICD-10-CM

## 2023-02-11 PROCEDURE — 99213 OFFICE O/P EST LOW 20 MIN: CPT | Mod: CS

## 2023-02-11 NOTE — PROGRESS NOTES
Randal Barajas is being evaluated via a billable video visit.      Assessment & Plan:     Pt eligible for COVID treatment due to: obesity, HTN.  Rx Paxlovid.  Hold diazepam, rsosuvastatin, sildenafil x 8 days    See patient instructions below.  At the end of the encounter, I discussed results, diagnosis, medications. Discussed red flags for being seen in person at clinic/ER, as well as indications for follow up if no improvement. Patient understood and agreed to plan.       ICD-10-CM    1. Infection due to 2019 novel coronavirus  U07.1 nirmatrelvir and ritonavir (PAXLOVID) therapy pack            No follow-ups on file.    Video-Visit Details    Type of service:  Video Visit    Video Start Time: 9:30am  Video End Time: 9:35am    Originating Location (pt. Location): Home    Distant Location (provider location):   [facility]     Mode of Communication:  Video Conference via OLYA Chew PA-C  [facility]    Subjective:   Randal Barajas is a 57 year old male who is contacted via telephone thru scheduled urgent care virtual visit to discuss: No chief complaint on file.    Fever, cough, myalgias, and congestion onset 4 days ago. Pt tested positive for COVID yesterday. He is feeling quite a bit better today. No treatments tried. Patient reports no  headache, chest pain, shortness of breath, abdominal pain, nausea, vomiting, diarrhea, rash, or any other symptoms.     Past Medical History:   Diagnosis Date     Pulmonary nodule 2/20/2017    Identified 11/30/2015 on CTA--needs one year follow-up        Objective:   Gen:  NAD  Pulm: non-labored work of breathing    No results found for any visits on 02/11/23.    There are no Patient Instructions on file for this visit.

## 2023-08-21 ENCOUNTER — PATIENT OUTREACH (OUTPATIENT)
Dept: CARE COORDINATION | Facility: CLINIC | Age: 58
End: 2023-08-21
Payer: COMMERCIAL

## 2023-09-04 ENCOUNTER — PATIENT OUTREACH (OUTPATIENT)
Dept: CARE COORDINATION | Facility: CLINIC | Age: 58
End: 2023-09-04
Payer: COMMERCIAL

## 2023-10-29 ENCOUNTER — HEALTH MAINTENANCE LETTER (OUTPATIENT)
Age: 58
End: 2023-10-29

## 2023-12-11 DIAGNOSIS — I10 HYPERTENSION WITH GOAL BLOOD PRESSURE LESS THAN 140/90: ICD-10-CM

## 2023-12-12 DIAGNOSIS — I10 HYPERTENSION WITH GOAL BLOOD PRESSURE LESS THAN 140/90: ICD-10-CM

## 2023-12-12 RX ORDER — METOPROLOL SUCCINATE 100 MG/1
100 TABLET, EXTENDED RELEASE ORAL DAILY
Qty: 90 TABLET | Refills: 0 | Status: SHIPPED | OUTPATIENT
Start: 2023-12-12 | End: 2024-01-23

## 2023-12-12 RX ORDER — LOSARTAN POTASSIUM AND HYDROCHLOROTHIAZIDE 25; 100 MG/1; MG/1
1 TABLET ORAL DAILY
Qty: 90 TABLET | Refills: 0 | Status: SHIPPED | OUTPATIENT
Start: 2023-12-12 | End: 2024-01-23

## 2023-12-13 RX ORDER — METOPROLOL SUCCINATE 100 MG/1
100 TABLET, EXTENDED RELEASE ORAL DAILY
Qty: 90 TABLET | Refills: 3 | OUTPATIENT
Start: 2023-12-13

## 2024-01-14 DIAGNOSIS — N52.2 DRUG-INDUCED ERECTILE DYSFUNCTION: ICD-10-CM

## 2024-01-15 RX ORDER — SILDENAFIL 100 MG/1
TABLET, FILM COATED ORAL
Qty: 30 TABLET | Refills: 0 | Status: SHIPPED | OUTPATIENT
Start: 2024-01-15

## 2024-01-23 ENCOUNTER — OFFICE VISIT (OUTPATIENT)
Dept: FAMILY MEDICINE | Facility: CLINIC | Age: 59
End: 2024-01-23
Payer: COMMERCIAL

## 2024-01-23 VITALS
WEIGHT: 279.9 LBS | HEART RATE: 71 BPM | BODY MASS INDEX: 37.91 KG/M2 | RESPIRATION RATE: 16 BRPM | DIASTOLIC BLOOD PRESSURE: 80 MMHG | TEMPERATURE: 98 F | SYSTOLIC BLOOD PRESSURE: 110 MMHG | HEIGHT: 72 IN | OXYGEN SATURATION: 95 %

## 2024-01-23 DIAGNOSIS — Z12.5 SCREENING FOR PROSTATE CANCER: ICD-10-CM

## 2024-01-23 DIAGNOSIS — R42 VERTIGO: ICD-10-CM

## 2024-01-23 DIAGNOSIS — Z00.00 ROUTINE GENERAL MEDICAL EXAMINATION AT A HEALTH CARE FACILITY: Primary | ICD-10-CM

## 2024-01-23 DIAGNOSIS — I10 HYPERTENSION WITH GOAL BLOOD PRESSURE LESS THAN 140/90: ICD-10-CM

## 2024-01-23 DIAGNOSIS — M21.42 FLAT FEET: ICD-10-CM

## 2024-01-23 DIAGNOSIS — E78.5 HYPERLIPIDEMIA, UNSPECIFIED HYPERLIPIDEMIA TYPE: ICD-10-CM

## 2024-01-23 DIAGNOSIS — M21.41 FLAT FEET: ICD-10-CM

## 2024-01-23 DIAGNOSIS — G56.23 CUBITAL TUNNEL SYNDROME OF BOTH UPPER EXTREMITIES: ICD-10-CM

## 2024-01-23 LAB
ALBUMIN SERPL BCG-MCNC: 4.3 G/DL (ref 3.5–5.2)
ALP SERPL-CCNC: 104 U/L (ref 40–150)
ALT SERPL W P-5'-P-CCNC: 63 U/L (ref 0–70)
ANION GAP SERPL CALCULATED.3IONS-SCNC: 11 MMOL/L (ref 7–15)
AST SERPL W P-5'-P-CCNC: 36 U/L (ref 0–45)
BILIRUB SERPL-MCNC: 0.9 MG/DL
BUN SERPL-MCNC: 21.1 MG/DL (ref 8–23)
CALCIUM SERPL-MCNC: 9.4 MG/DL (ref 8.6–10)
CHLORIDE SERPL-SCNC: 99 MMOL/L (ref 98–107)
CHOLEST SERPL-MCNC: 212 MG/DL
CREAT SERPL-MCNC: 0.86 MG/DL (ref 0.67–1.17)
DEPRECATED HCO3 PLAS-SCNC: 25 MMOL/L (ref 22–29)
EGFRCR SERPLBLD CKD-EPI 2021: >90 ML/MIN/1.73M2
FASTING STATUS PATIENT QL REPORTED: ABNORMAL
GLUCOSE SERPL-MCNC: 93 MG/DL (ref 70–99)
HDLC SERPL-MCNC: 60 MG/DL
LDLC SERPL CALC-MCNC: 129 MG/DL
NONHDLC SERPL-MCNC: 152 MG/DL
POTASSIUM SERPL-SCNC: 4.3 MMOL/L (ref 3.4–5.3)
PROT SERPL-MCNC: 8.2 G/DL (ref 6.4–8.3)
PSA SERPL DL<=0.01 NG/ML-MCNC: 1.55 NG/ML (ref 0–3.5)
SODIUM SERPL-SCNC: 135 MMOL/L (ref 135–145)
TRIGL SERPL-MCNC: 114 MG/DL

## 2024-01-23 PROCEDURE — 99396 PREV VISIT EST AGE 40-64: CPT | Performed by: NURSE PRACTITIONER

## 2024-01-23 PROCEDURE — 80061 LIPID PANEL: CPT | Performed by: NURSE PRACTITIONER

## 2024-01-23 PROCEDURE — 36415 COLL VENOUS BLD VENIPUNCTURE: CPT | Performed by: NURSE PRACTITIONER

## 2024-01-23 PROCEDURE — G0103 PSA SCREENING: HCPCS | Performed by: NURSE PRACTITIONER

## 2024-01-23 PROCEDURE — 80053 COMPREHEN METABOLIC PANEL: CPT | Performed by: NURSE PRACTITIONER

## 2024-01-23 RX ORDER — MECLIZINE HYDROCHLORIDE 25 MG/1
12.5-25 TABLET ORAL 3 TIMES DAILY PRN
Qty: 20 TABLET | Refills: 1 | Status: SHIPPED | OUTPATIENT
Start: 2024-01-23

## 2024-01-23 RX ORDER — METOPROLOL SUCCINATE 100 MG/1
100 TABLET, EXTENDED RELEASE ORAL DAILY
Qty: 90 TABLET | Refills: 3 | Status: SHIPPED | OUTPATIENT
Start: 2024-01-23

## 2024-01-23 RX ORDER — ROSUVASTATIN CALCIUM 10 MG/1
10 TABLET, COATED ORAL DAILY
Qty: 90 TABLET | Refills: 3 | Status: SHIPPED | OUTPATIENT
Start: 2024-01-23

## 2024-01-23 RX ORDER — LOSARTAN POTASSIUM AND HYDROCHLOROTHIAZIDE 25; 100 MG/1; MG/1
1 TABLET ORAL DAILY
Qty: 90 TABLET | Refills: 0 | Status: SHIPPED | OUTPATIENT
Start: 2024-01-23 | End: 2024-06-17

## 2024-01-23 ASSESSMENT — PAIN SCALES - GENERAL: PAINLEVEL: NO PAIN (0)

## 2024-01-23 NOTE — PATIENT INSTRUCTIONS
If interested in the shingles shot, let us know.  Double checking insurance coverage would be a good idea.     Refills sent off.      If the hemorrhoids worsen, let me know.     Updating lab work.     I highlighted the number for the physical therapy department that may be able to help with your suspected cubital tunnel syndrome in the elbows.        Preventive Health Recommendations  Male Ages 50 - 64    Yearly exam:             See your health care provider every year in order to  o   Review health changes.   o   Discuss preventive care.    o   Review your medicines if your doctor has prescribed any.   Have a cholesterol test every 5 years, or more frequently if you are at risk for high cholesterol/heart disease.   Have a diabetes test (fasting glucose) every three years. If you are at risk for diabetes, you should have this test more often.   Have a colonoscopy at age 45, or have a yearly FIT test (stool test). These exams will check for colon cancer.    Talk with your health care provider about whether or not a prostate cancer screening test (PSA) is right for you.  You should be tested each year for STDs (sexually transmitted diseases), if you re at risk.     Shots: Get a flu shot each year. Get a tetanus shot every 10 years.     Nutrition:  Eat at least 5 servings of fruits and vegetables daily.   Eat whole-grain bread, whole-wheat pasta and brown rice instead of white grains and rice.   Get adequate Calcium and Vitamin D.     Lifestyle  Exercise for at least 150 minutes a week (30 minutes a day, 5 days a week). This will help you control your weight and prevent disease.   Limit alcohol to one drink per day.   No smoking.   Wear sunscreen to prevent skin cancer.   See your dentist every six months for an exam and cleaning.   See your eye doctor every 1 to 2 years.

## 2024-01-23 NOTE — LETTER
January 25, 2024      Eh Barajas  [address] [id number]        Dear ,    We are writing to inform you of your test results.      Cholesterol levels did jump back up since running out of the statin medicine.  Kidneys, liver, and electrolytes look okay.  Prostate cancer screen is normal.       Resulted Orders   Comprehensive metabolic panel (BMP + Alb, Alk Phos, ALT, AST, Total. Bili, TP)   Result Value Ref Range    Sodium 135 135 - 145 mmol/L      Comment:      Reference intervals for this test were updated on 09/26/2023 to more accurately reflect our healthy population. There may be differences in the flagging of prior results with similar values performed with this method. Interpretation of those prior results can be made in the context of the updated reference intervals.     Potassium 4.3 3.4 - 5.3 mmol/L    Carbon Dioxide (CO2) 25 22 - 29 mmol/L    Anion Gap 11 7 - 15 mmol/L    Urea Nitrogen 21.1 8.0 - 23.0 mg/dL    Creatinine 0.86 0.67 - 1.17 mg/dL    GFR Estimate >90 >60 mL/min/1.73m2    Calcium 9.4 8.6 - 10.0 mg/dL    Chloride 99 98 - 107 mmol/L    Glucose 93 70 - 99 mg/dL    Alkaline Phosphatase 104 40 - 150 U/L      Comment:      Reference intervals for this test were updated on 11/14/2023 to more accurately reflect our healthy population. There may be differences in the flagging of prior results with similar values performed with this method. Interpretation of those prior results can be made in the context of the updated reference intervals.    AST 36 0 - 45 U/L      Comment:      Reference intervals for this test were updated on 6/12/2023 to more accurately reflect our healthy population. There may be differences in the flagging of prior results with similar values performed with this method. Interpretation of those prior results can be made in the context of the updated reference intervals.    ALT 63 0 - 70 U/L      Comment:      Reference intervals for this test were updated  on 6/12/2023 to more accurately reflect our healthy population. There may be differences in the flagging of prior results with similar values performed with this method. Interpretation of those prior results can be made in the context of the updated reference intervals.      Protein Total 8.2 6.4 - 8.3 g/dL    Albumin 4.3 3.5 - 5.2 g/dL    Bilirubin Total 0.9 <=1.2 mg/dL   PSA, screen   Result Value Ref Range    Prostate Specific Antigen Screen 1.55 0.00 - 3.50 ng/mL    Narrative    This result is obtained using the Roche Elecsys total PSA method on the mery [id number] immunoassay analyzer. Results obtained with different assay methods or kits cannot be used interchangeably.   Lipid panel   Result Value Ref Range    Cholesterol 212 (H) <200 mg/dL    Triglycerides 114 <150 mg/dL    Direct Measure HDL 60 >=40 mg/dL    LDL Cholesterol Calculated 129 (H) <=100 mg/dL    Non HDL Cholesterol 152 (H) <130 mg/dL    Patient Fasting > 8hrs? Unknown     Narrative    Cholesterol  Desirable:  <200 mg/dL    Triglycerides  Normal:  Less than 150 mg/dL  Borderline High:  150-199 mg/dL  High:  200-499 mg/dL  Very High:  Greater than or equal to 500 mg/dL    Direct Measure HDL  Female:  Greater than or equal to 50 mg/dL   Male:  Greater than or equal to 40 mg/dL    LDL Cholesterol  Desirable:  <100mg/dL  Above Desirable:  100-129 mg/dL   Borderline High:  130-159 mg/dL   High:  160-189 mg/dL   Very High:  >= 190 mg/dL    Non HDL Cholesterol  Desirable:  130 mg/dL  Above Desirable:  130-159 mg/dL  Borderline High:  160-189 mg/dL  High:  190-219 mg/dL  Very High:  Greater than or equal to 220 mg/dL       If you have any questions or concerns, please call the clinic at the number listed above.       Sincerely,      Willian Wright NP

## 2024-01-23 NOTE — PROGRESS NOTES
Preventive Care Visit  [facility]  Willian Wright NP,    Jan 23, 2024      SUBJECTIVE:   Eh is a 58 year old, presenting for the following:  Physical (Fasting- has been off cholesterol meds for 2 modue to no refills)        1/23/2024     9:58 AM   Additional Questions   Roomed by Miranda Shrestha CMA     Overall doing well.  Ran out of statin a while ago because he did not know he can get a bridge to this appointment.    Weight unfortunately has crept back up again to 279 pounds.  Previously was 255 pounds.    Blood pressure shows good control with current metoprolol/losartan-hydrochlorothiazide. His right knee gives him grief and he says he'll need a knee replacement likely summer 2025.      Hand numbness  Occurs when he is using the mouse for a prolonged period of time.  Also occurs with bike riding.  Will feel a shifting/snapping sensation in the elbow sometimes around this.  Gets uncomfortable.  Will have numbness radiate from elbow down to the hands.    Has been dealing with intermittent episodes of hemorrhoids.  Using over-the-counter Preparation H which does provide temporary relief of symptoms.    Healthy Habits:     Getting at least 3 servings of Calcium per day:  Yes    Bi-annual eye exam:  Yes    Dental care twice a year:  Yes    Sleep apnea or symptoms of sleep apnea:  Sleep apnea    Diet:  Low salt    Frequency of exercise:  2-3 days/week    Duration of exercise:  30-45 minutes    Taking medications regularly:  Yes    Medication side effects:  None    Additional concerns today:  No      Today's PHQ-2 Score:       1/23/2024    10:01 AM   PHQ-2 ( 1999 Pf[employer])   Q1: Little interest or pleasure in doing things 0   Q2: Feeling down, depressed or hopeless 0   PHQ-2 Score 0   Q1: Little interest or pleasure in doing things Not at all   Q2: Feeling down, depressed or hopeless Not at all   PHQ-2 Score 0       Social History     Tobacco Use    Smoking status: Never    Smokeless tobacco:  Never   Substance Use Topics    Alcohol use: Not on file         1/23/2024    10:01 AM   Alcohol Use   Prescreen: >3 drinks/day or >7 drinks/week? Yes   AUDIT SCORE  8         1/23/2024    10:01 AM   AUDIT - Alcohol Use Disorders Identification Test - Reproduced from the World Health Organization Audit 2001 (Second Edition)   1.  How often do you have a drink containing alcohol? 2 to 3 times a week   2.  How many drinks containing alcohol do you have on a typical day when you are drinking? 5 or 6   3.  How often do you have five or more drinks on one occasion? Weekly   4.  How often during the last year have you found that you were not able to stop drinking once you had started? Never   5.  How often during the last year have you failed to do what was normally expected of you because of drinking? Never   6.  How often during the last year have you needed a first drink in the morning to get yourself going after a heavy drinking session? Never   7.  How often during the last year have you had a feeling of guilt or remorse after drinking? Never   8.  How often during the last year have you been unable to remember what happened the night before because of your drinking? Never   9.  Have you or someone else been injured because of your drinking? No   10. Has a relative, friend, doctor or other health care worker been concerned about your drinking or suggested you cut down? No   TOTAL SCORE 8     Last PSA:   Prostate Specific Antigen Screen   Date Value Ref Range Status   09/20/2022 0.67 0.00 - 3.50 ng/mL Final   08/12/2021 0.62 0.00 - 3.50 ug/L Final       Reviewed orders with patient. Reviewed health maintenance and updated orders accordingly - Yes  Lab work is in process    Reviewed and updated as needed this visit by clinical staff   Tobacco  Allergies  Meds    Surg Hx  Fam Hx          Reviewed and updated as needed this visit by Provider        Surg Hx  Fam Hx          Past Medical History:   Diagnosis Date     "Pulmonary nodule 2/20/2017    Identified 11/30/2015 on CTA--needs one year follow-up       Past Surgical History:   Procedure Laterality Date    KNEE ARTHROSCOPY W/ DEBRIDEMENT       Review of Systems   HENT:  Positive for hearing loss.      OBJECTIVE:   /80 (BP Location: Left arm, Patient Position: Sitting, Cuff Size: Adult Large)   Pulse 71   Temp 98  F (36.7  C) (Oral)   Resp 16   Ht 1.822 m (5' 11.75\")   Wt 127 kg (279 lb 14.4 oz)   SpO2 95%   BMI 38.23 kg/m     Estimated body mass index is 38.23 kg/m  as calculated from the following:    Height as of this encounter: 1.822 m (5' 11.75\").    Weight as of this encounter: 127 kg (279 lb 14.4 oz).  Physical Exam  GENERAL: alert and no distress  EYES: Eyes grossly normal to inspection, PERRL and conjunctivae and sclerae normal  HENT: ear canals and TM's normal, nose and mouth without ulcers or lesions  NECK: no adenopathy, no asymmetry, masses, or scars  RESP: lungs clear to auscultation - no rales, rhonchi or wheezes  CV: regular rate and rhythm, normal S1 S2, no S3 or S4, no murmur, click or rub, no peripheral edema  ABDOMEN: soft, nontender, no hepatosplenomegaly, no masses and bowel sounds normal  MS: no gross musculoskeletal defects noted, no edema  SKIN: no suspicious lesions or rashes  NEURO: Normal strength and tone, mentation intact and speech normal  PSYCH: mentation appears normal, affect normal/bright  LYMPH: no cervical, supraclavicular, axillary, or inguinal adenopathy    Diagnostic Test Results:  Labs reviewed in Epic    ASSESSMENT/PLAN:       ICD-10-CM    1. Routine general medical examination at a health care facility  Z00.00       2. Hypertension with goal blood pressure less than 140/90  I10 metoprolol succinate ER (TOPROL XL) 100 MG 24 hr tablet     Comprehensive metabolic panel (BMP + Alb, Alk Phos, ALT, AST, Total. Bili, TP)     losartan-hydrochlorothiazide (HYZAAR) 100-25 MG tablet      3. Hyperlipidemia, unspecified hyperlipidemia " "type  E78.5 rosuvastatin (CRESTOR) 10 MG tablet     Comprehensive metabolic panel (BMP + Alb, Alk Phos, ALT, AST, Total. Bili, TP)     Lipid panel      4. Vertigo  R42 meclizine (ANTIVERT) 25 MG tablet      5. Cubital tunnel syndrome of both upper extremities  G56.23 Physical Therapy Referral      6. Flat feet  M21.41 Orthotics and Prosthetics Order Orthotic; Foot Orthotics    M21.42       7. Screening for prostate cancer  Z12.5 PSA, screen        Update screening labs.  Connect with orthotics for custom foot orthotics.  Try PT for cubital tunnel syndrome.  Update labs.  Anticipate higher lipids due to running out of statin medicine.  Continue same antihypertensives.  Reviewed shingles vaccine.  Reviewed healthy lifestyle behaviors.  Colonoscopy due in 2026.  If hemorrhoids worsen, let me know and we can try some prescription cream and see if still having issues connect with colorectal specialist.    Patient has been advised of split billing requirements and indicates understanding: Yes      Counseling  Reviewed preventive health counseling, as reflected in patient instructions      BMI  Estimated body mass index is 38.23 kg/m  as calculated from the following:    Height as of this encounter: 1.822 m (5' 11.75\").    Weight as of this encounter: 127 kg (279 lb 14.4 oz).   Weight management plan: Discussed healthy diet and exercise guidelines      He reports that he has never smoked. He has never used smokeless tobacco.      Signed Electronically by: Willian Wright NP  Answers submitted by the patient for this visit:  Annual Preventive Visit (Submitted on 1/23/2024)  Chief Complaint: Annual Exam:  impotence: Yes    "

## 2024-03-11 DIAGNOSIS — I10 HYPERTENSION WITH GOAL BLOOD PRESSURE LESS THAN 140/90: ICD-10-CM

## 2024-03-11 RX ORDER — METOPROLOL SUCCINATE 100 MG/1
100 TABLET, EXTENDED RELEASE ORAL DAILY
Qty: 90 TABLET | Refills: 3 | OUTPATIENT
Start: 2024-03-11

## 2024-03-15 DIAGNOSIS — I10 HYPERTENSION WITH GOAL BLOOD PRESSURE LESS THAN 140/90: ICD-10-CM

## 2024-03-15 RX ORDER — LOSARTAN POTASSIUM AND HYDROCHLOROTHIAZIDE 25; 100 MG/1; MG/1
1 TABLET ORAL DAILY
Qty: 90 TABLET | Refills: 0 | OUTPATIENT
Start: 2024-03-15

## 2024-03-15 NOTE — TELEPHONE ENCOUNTER
Refill Request  Medication name: Pending Prescriptions:                       Disp   Refills    losartan-hydrochlorothiazide (HYZAAR) 100*90 tab*0            Sig: Take 1 tablet by mouth daily    Requested Pharmacy:  [id number] [address] AT [facility] [address]

## 2024-03-18 DIAGNOSIS — I10 HYPERTENSION WITH GOAL BLOOD PRESSURE LESS THAN 140/90: ICD-10-CM

## 2024-03-18 RX ORDER — LOSARTAN POTASSIUM AND HYDROCHLOROTHIAZIDE 25; 100 MG/1; MG/1
1 TABLET ORAL DAILY
Qty: 90 TABLET | Refills: 0 | OUTPATIENT
Start: 2024-03-18

## 2024-04-26 ENCOUNTER — TELEPHONE (OUTPATIENT)
Dept: FAMILY MEDICINE | Facility: CLINIC | Age: 59
End: 2024-04-26
Payer: COMMERCIAL

## 2024-04-26 DIAGNOSIS — G47.30 SEVERE SLEEP APNEA: Primary | ICD-10-CM

## 2024-04-26 NOTE — TELEPHONE ENCOUNTER
Forms/Letter Request    Do we have the form/letter: Willian Wright's desk    Who is the form from? Patient dropped off forms from [facility]    Where did/will the form come from? Patient or family brought in       When is form/letter needed by: .When completed    How would you like the form/letter returned: Fax to [facility]

## 2024-05-01 PROBLEM — G47.30 SEVERE SLEEP APNEA: Status: ACTIVE | Noted: 2024-05-01

## 2024-05-01 NOTE — TELEPHONE ENCOUNTER
Please call patient.  Filling out paperwork for [employer].  How often is he using his CPAP machine?  I do not see any recent updated data downloaded    Willian Wright, CNP

## 2024-06-17 DIAGNOSIS — I10 HYPERTENSION WITH GOAL BLOOD PRESSURE LESS THAN 140/90: ICD-10-CM

## 2024-06-17 RX ORDER — LOSARTAN POTASSIUM AND HYDROCHLOROTHIAZIDE 25; 100 MG/1; MG/1
1 TABLET ORAL DAILY
Qty: 90 TABLET | Refills: 1 | Status: SHIPPED | OUTPATIENT
Start: 2024-06-17

## 2024-06-17 NOTE — TELEPHONE ENCOUNTER
Refill Request  Medication name: Pending Prescriptions:                       Disp   Refills    losartan-hydrochlorothiazide (HYZAAR) 100*90 tab*0            Sig: Take 1 tablet by mouth daily    Requested Pharmacy:  [facility] #[id number] [facility] [address] AT [facility] [address]

## 2024-09-10 ENCOUNTER — TELEPHONE (OUTPATIENT)
Dept: FAMILY MEDICINE | Facility: CLINIC | Age: 59
End: 2024-09-10
Payer: COMMERCIAL

## 2024-09-10 NOTE — TELEPHONE ENCOUNTER
Patient called in with complaints of his knee he recently had surgery on being locked in place. He states the back of his knee is slightly swollen, but he can't bend or move it at all. When questioned on if he reached out to the surgery team he stated he had not. He was recommended to do so and agreed he would reach out and call back if needed.    Elder Brewer RN  [facility]

## 2024-10-01 ENCOUNTER — OFFICE VISIT (OUTPATIENT)
Dept: FAMILY MEDICINE | Facility: CLINIC | Age: 59
End: 2024-10-01
Payer: COMMERCIAL

## 2024-10-01 VITALS
HEIGHT: 71 IN | SYSTOLIC BLOOD PRESSURE: 114 MMHG | OXYGEN SATURATION: 96 % | TEMPERATURE: 98.8 F | DIASTOLIC BLOOD PRESSURE: 78 MMHG | HEART RATE: 98 BPM | RESPIRATION RATE: 16 BRPM | BODY MASS INDEX: 38.08 KG/M2 | WEIGHT: 272 LBS

## 2024-10-01 DIAGNOSIS — H92.01 RIGHT EAR PAIN: Primary | ICD-10-CM

## 2024-10-01 PROCEDURE — 99213 OFFICE O/P EST LOW 20 MIN: CPT | Mod: 25

## 2024-10-01 PROCEDURE — 69210 REMOVE IMPACTED EAR WAX UNI: CPT

## 2024-10-01 ASSESSMENT — PAIN SCALES - GENERAL: PAINLEVEL: NO PAIN (1)

## 2024-10-01 NOTE — PROGRESS NOTES
Assessment & Plan     Right ear pain  Patient with intermittent right ear pain that at times radiates down the right posterior/lateral face in the parotid gland/jaw line region. I was able to remove a large amount of hard/impacted cerumen from the right ear canal today. I was then able to visualize the right TM which was normal in appearance. I am not noting any swelling, gland enlargement, or tenderness surrounding the right ear or down the right neck. No fevers. I suspect bothersome pain may have been secondary to cerumen impaction. Recommend continued tylenol/ibuprofen and close monitoring of symptoms. Should symptoms fail to improve within the next few days he will reach back out. The patient expressed understanding and was in agreement with the POC.        Rocky Stauffer is a 58 year old, presenting for the following health issues:  Health Maintenance (X4 weeks gum pain, swollen glands, sore throat, ear pain. Right side. )        10/1/2024     2:18 PM   Additional Questions   Roomed by Cassi MASON LPN     History of Present Illness       Reason for visit:  Ear pain  Symptom onset:  3-4 weeks ago  Symptoms include:  Ear pain  Symptom intensity:  Moderate  Symptom progression:  Staying the same  Had these symptoms before:  No  What makes it worse:  No  What makes it better:  No   He is taking medications regularly.     Acute Illness  Acute illness concerns: Right ear ache, right parotid gland pain, right teeth pain  Onset/Duration:  roughly 4 weeks ago  Symptoms:  Fever: No  Chills/Sweats: No  Headache (location?): No  Sinus Pressure: No  Conjunctivitis:  No  Ear Pain: YES: right  Rhinorrhea: No  Congestion: No  Sore Throat: YES, a little bit on the bottom right side  Cough: no  Wheeze: No  Decreased Appetite: No  Nausea: No  Vomiting: No  Diarrhea: No  Dysuria/Freq.: No  Dysuria or Hematuria: No  Fatigue/Achiness: No  Sick/Strep Exposure: No  Therapies tried and outcome: Has tried ibuprofen prn       Review  "of Systems  Constitutional, HEENT, cardiovascular, pulmonary, gi and gu systems are negative, except as otherwise noted.      Objective    /78 (BP Location: Right arm, Patient Position: Sitting, Cuff Size: Adult Large)   Pulse 98   Temp 98.8  F (37.1  C) (Oral)   Resp 16   Ht 1.803 m (5' 11\")   Wt 123.4 kg (272 lb)   SpO2 96%   BMI 37.94 kg/m    Body mass index is 37.94 kg/m .  Physical Exam   GENERAL: alert and no distress  EYES: Eyes grossly normal to inspection, conjunctivae and sclerae normal  HENT: Right ear canal impacted with cerumen. Once cleared TM appears normal, nose and mouth without ulcers or lesions. Left TM appears normal.   NECK: no adenopathy, no asymmetry, masses, or scars. No tenderness to palpation.  RESP: lungs clear to auscultation - no rales, rhonchi or wheezes  CV: regular rate and rhythm, normal S1 S2, no S3 or S4, no murmur, click or rub, no peripheral edema    Procedure:  Using an alligator forceps and otoscope a large impacted piece of cerumen is successfully removed from the right ear canal. The patient tolerated the procedure well no with no complaints or loss of blood.        Signed Electronically by: Shruti Tony PA-C    "

## 2024-12-09 DIAGNOSIS — I10 HYPERTENSION WITH GOAL BLOOD PRESSURE LESS THAN 140/90: ICD-10-CM

## 2024-12-09 RX ORDER — LOSARTAN POTASSIUM AND HYDROCHLOROTHIAZIDE 25; 100 MG/1; MG/1
1 TABLET ORAL DAILY
Qty: 90 TABLET | Refills: 0 | Status: SHIPPED | OUTPATIENT
Start: 2024-12-09

## 2024-12-26 ENCOUNTER — PATIENT OUTREACH (OUTPATIENT)
Dept: CARE COORDINATION | Facility: CLINIC | Age: 59
End: 2024-12-26
Payer: COMMERCIAL

## 2025-01-09 ENCOUNTER — PATIENT OUTREACH (OUTPATIENT)
Dept: CARE COORDINATION | Facility: CLINIC | Age: 60
End: 2025-01-09
Payer: COMMERCIAL

## 2025-01-23 DIAGNOSIS — E78.5 HYPERLIPIDEMIA, UNSPECIFIED HYPERLIPIDEMIA TYPE: ICD-10-CM

## 2025-01-23 NOTE — LETTER
February 11, 2025      Randal Barajas  [address] [facility] [address]        Dear Randal,     Our records indicate that you are overdue for an appointment with your physician for one of the following: annual physical. Please contact our office at [phone] as soon as possible to schedule an appointment as we have been unable to reach you.     If you have receive this care elsewhere, please contact our office so we can update your medical record. If you have transferred care please call and request a records release. This will ensure that your new physician has complete records to provide you with the best care.        Sincerely,      The physicians and staff at the [facility]

## 2025-01-24 RX ORDER — ROSUVASTATIN CALCIUM 10 MG/1
10 TABLET, COATED ORAL DAILY
Qty: 90 TABLET | Refills: 0 | Status: SHIPPED | OUTPATIENT
Start: 2025-01-24

## 2025-03-13 NOTE — PROGRESS NOTES
Assessment / Plan  1. Cough due to ACE inhibitor     2. Drug-induced erectile dysfunction  tadalafiL (CIALIS) 20 MG tablet   3. Hypertension with goal blood pressure less than 140/90  losartan-hydrochlorothiazide (HYZAAR) 100-25 mg per tablet   4. Rash and nonspecific skin eruption  betamethasone dipropionate (DIPROLENE) 0.05 % ointment    Varicella-Zoster Virus DNA by PCR, CSF or Skin Swab(VZDNA)   5. Recurrent Conjunctivitis of right eye   Ambulatory referral to Optometry   6. Flu vaccine need  Influenza, Seasonal Quad, PF =/> 6months   Good with patients that given his current episodes of conjunctival irritation I would recommend that he consults with ophthalmology for further assessment.  We reviewed that his examination including fluorescein staining of the cornea today was normal.    Discussed with patient my suspicion that his chronic cough is related to ACE inhibitor use.  In hindsight he agrees that that of the cough would correlate rather well with addition of treatment with lisinopril.  Patient has had excellent response however to that medication in terms of blood pressure control.  Recommend discontinuation of lisinopril/hydrochlorothiazide, to replaced with losartan/hydrochlorothiazide 100/25mg daily.  Expect cough will gradually improve over the next 6 weeks.  Patient is advised to follow-up if not seeing improvement beginning within the next few weeks.    Reviewed her options for erectile dysfunction.  Patient is provided with a prescription for tadalafil, as well as a coupon from [employer].  Reviewed use of medication including reasonable expectations for its therapeutic effect and potential side effects.  Is advised to contact me if any significant side effects or let up and after trialing it on at least 2 occasions.  Discouraged him from using alcohol prior to sex.    Recommend swab for varicella zoster for skin lesion at the left thigh.  In the meantime while awaiting results, patient  We will test you for a number of things today that can explain your symptoms. Many of these are viral illnesses that will be well treated with the options listed below    We will test you today for strep throat    If your rapid strep test was positive today, we will treat with a course of antibiotics. Please complete the full course of antibiotics regardless of symptom improvement.  This medication may cause some stomach upset, so you can take it with food to prevent this. You will be contagious until you have completed 24 hours of the medication, so avoid coming in close contact with others, and especially sharing beverages or food.  Please discard and replace your toothbrush after 24 hours on antibiotics to avoid reinfection.    If your rapid strep test was negative today, we will need to wait for the strep PCR results to have a definitive diagnosis. If both test are negative, this likely means that your illness is related to a viral infection.  Viral infections generally clear up on their own within a week but there are some things you can do at home to make yourself more comfortable.      I have prescribed you an inhaler to help with your coughing. It is called albuterol, and it will help to open up the airways to your lungs. You can use 2 puffs when you are coughing, wheezing, or feeling short of breath. If your symptoms do not improve after a few minutes, you can use 2 more puffs. I would encourage you to be seen in the clinic if you are needing more than 4 puffs to resolve your breathing concerns.     I have also prescribed a medication called Tessalon Perels. This is a cough medication that can be useful to prevent coughing jags overnight, and help you get a good nights rest. This medication can be used during the day, but it can often make people a bit drowsy, so I would encourage you to take it in the evening for the first time to determine how your body reacts to the medication.    Cepacol lozenges: These  are lozenges that you will suck on like a cough drops or hard candy.  This will help to manage your throat pains that you are able to continue to eat and drink    Saline spray: I would like you to  with saline spray over-the-counter.  This works best when you lean slightly forward, insert the spray nozzle into your nostril, and direct the nozzle towards the opposite side of your face.  This is sometimes easier to be done in the shower over the sink as it can get a little bit messy.  You will know that you have done this correctly if your eyes slightly water after spraying the solution.  You can do this as prescribed on the box for as long as it takes to treat your symptoms.    Ibuprofen and Tylenol: You can take ibuprofen and Tylenol as prescribed on the box around the clock.  You can either take them on alternating schedules or you can take them together.  These medications work differently in your body, and are safe to be taken together.    Humidifier: Using a humidifier in the area that you sleep or taking a very hot shower to inhale some of the vaporized steam can help to open up your nasal passages and sinuses and encourage them to drain.    Rest: get adequate rest including 7-8 hours of sleep, and low activity levels during the day to encourage healing. Avoid high impact exercise and rigorous physical labor    Nutrition: support healing by fueling your body with healthy foods. Fruits, vegetables, and whole foods are all great options!    Hydration: increase fluid intake. Illness leads to increased dehydration, so your body needs more fluid intake than it might when you are healthy. Prather and sugar free teas are great options. Try to avoid beverages that cause more dehydration including coffee, soda, energy drinks, and alcohol.     Please follow-up in about 1 week if symptoms are not improving.    It is important to seek immediate medical attention if you are having symptoms of chest pain, fever,  prescription for betamethasone dipropionate 0.05% ointment to assist in controlling itching.     Patient Instructions   Please stop use of lisinopril-HCTZ and instead start losartan-HCTZ.  It should also be taken once per day.  I would expect that your cough should gradually improve over the coming few weeks.   Please try to check your blood pressures at home a few times a week over the next couple of weeks and let us know if you are getting systolic readings (the top number) greater than 140mmHg or diastolic readings (the bottom number) greater than 90 mmHg.     Please contact [facility] in [place] to set up a consultation with one of their optometrists for an eye exam and review of your recurrent episodes of conjunctivitis. Their number is [phone].      Please bring the printed coupon and prescription to [facility] to have them fill the Cialis for you.  This option would typically be the least expensive way to obtain prescription Cialis.      I should have the results of the skin test back on Friday or next Monday.  In the meantime, you could apply the topical steroid ointment if needed.  I sent a refill for this to your pharmacy.     And I completely forgot to keep you here for a flu shot today! Please give us a call at your convenience and let us know when you would like to stop over for a quick nurse visit to receive it.      Follow up with blood pressure readings by phone in 2 weeks.     48 minutes spent on the date of the encounter doing chart review, history and exam, documentation and further activities as noted above    Subjective   Randal Barajas is a 55 y.o. year old male who presents with the following concerns:     Conjunctivitis  Patient describes recurrent issues with the right eye for the past 2-3 months.  He describes episodic inflammation of the conjunctiva with significant drainage from the eye and mattering in the mornings.  He has been treated for this on at least a couple of  "occasions with antibiotic eyedrops which do seem to be effective, but symptoms then also seem to recur within a couple of weeks.  When discharge is significant, vision can be affected.  He does not describe I pain, per se though feels as if the surface of the eye is \"scratchy.\"  He denies any known foreign bodies in the eye.  He does not think he has been performing any activities that would put him at high risk for this.    Chronic cough-patient reports persistent cough ongoing for the last 6 to 8 months.  He has been evaluated on at least a few occasions for this cough, including several tests for COVID-19 infection, which have been negative.  He describes feeling as if it is emanating from the lower right side of his chest.  He describes it also as a \"tickle\" in the back of his throat, which instigates the cough.  He describes the cough as paroxysmal, sometimes occurring so repeatedly that he will gag.  He does not associate the cough with any particular environmental exposures or situations.  He feels that the cough will occur on a fairly random basis.  He does not feel associated shortness of breath.  The cough has not been productive for him.  It does not significantly affect his sleep at night.  Notably, his blood pressure medications were adjusted 8 months ago.  HCTZ was discontinued in favor of combination lisinopril-HCTZ. He also continued on metoprolol XL.     Erectile dysfunction-patient reports that since starting on the new blood pressure medications he has noted increased difficulty with sustaining erections to completion.  No changes in libido have been appreciated.  He has brief difficulties relate to the medications or simply to age.  Patient reports that he has wondered about medications to treat erectile dysfunction, but has not brought this up with his primary care provider in the past.  He is interesting in considering a trial of 1 of these medications if possible.    Skin lesion left thigh - " shortness of breath, palpitations, or any changes in your mental status.     patient reports recurring skin lesion that is somewhat itchy at the left thigh.  It seems like a small blister.  He reports that current episode just started a few days ago.  No weeping from it currently.  No associated pains noted.  He has not been applying anything to it.  No known contacts with similar skin lesions.     Patient Active Problem List   Diagnosis     History of pulmonary embolism     History of DVT (deep vein thrombosis)     Hypertension with goal blood pressure less than 140/90     Pulmonary nodule     Obesity (BMI 35.0-39.9) with comorbidity (H)     Hyperlipemia     No past surgical history on file.    Social History     Tobacco Use     Smoking status: Never Smoker     Smokeless tobacco: Never Used   Substance Use Topics     Alcohol use: Not on file     No family history on file.      Current Outpatient Medications   Medication Sig Dispense Refill     atorvastatin (LIPITOR) 10 MG tablet Take 1 tablet (10 mg total) by mouth daily. 90 tablet 2     betamethasone dipropionate (DIPROLENE) 0.05 % ointment APPLY TO LEG RASH BID PRN 30 g 0     LORazepam (ATIVAN) 0.5 MG tablet Take 1 tablet (0.5 mg total) by mouth 3 (three) times a day as needed for anxiety. 15 tablet 0     metoprolol succinate (TOPROL-XL) 50 MG 24 hr tablet Take 1 tablet (50 mg total) by mouth daily. 90 tablet 3     ciprofloxacin HCl (CILOXAN) 0.3 % ophthalmic solution Administer 1 drop, every 2 hours, while awake, for 2 days. Then 1 drop, every 4 hours, while awake, for the next 5 days. 5 mL 0     Lisinopril-HCTZ 20-25MG Take 1 tablet by mouth daily. 30 tablet 2     tadalafiL (CIALIS) 20 MG tablet Take 1 tablet (20 mg total) by mouth daily as needed for erectile dysfunction. 30 tablet PRN     No current facility-administered medications for this visit.      Allergies   Allergen Reactions    NONE Cough     ROS:   Negative except as noted above in HPI.     Objective  /84   Pulse 85   Temp 98.8  F (37.1  C) (Oral)   Resp 20   Ht 5'  "11\" (1.803 m)   Wt (!) 289 lb (131.1 kg)   SpO2 97%   BMI 40.31 kg/m       General: alert/oriented to person/place. No apparent distress.   Affect: pleasant/cooperative.   Eyes: right conjunctiva mildly injected with mild amount of clear drainage from the eye. No tenderness. No significant eyelid swelling. No foreign bodies were visualized.  Neck: supple without lymph node swelling.  CV: RR s1/s2  Respiratory: clear to auscultation bilaterally  Extremities: no clubbing/cyanosis/edema      Recommended to patient that we pursue fluorescein staining of the cornea to evaluate for any corneal injury that could be present in causing his current symptoms.  After instilling 2 drops of tetracaine 0.5% ophthalmic solution into the right eye, fluorescein saturated strip was drawn across the lower eyelid.  After patient was able to blink several times to distribute the fluorescein, black light was used to evaluate the cornea.  No irregularities were seen.    Peter Hernadez MD   Family medicine float physician  [facility]     "

## 2025-04-11 RX ORDER — SILDENAFIL 100 MG/1
100 TABLET, FILM COATED ORAL DAILY PRN
Qty: 30 TABLET | Refills: 0 | Status: CANCELLED | OUTPATIENT
Start: 2025-04-11

## 2025-04-15 ENCOUNTER — OFFICE VISIT (OUTPATIENT)
Dept: FAMILY MEDICINE | Facility: CLINIC | Age: 60
End: 2025-04-15
Payer: COMMERCIAL

## 2025-04-15 VITALS
WEIGHT: 275.6 LBS | RESPIRATION RATE: 18 BRPM | HEIGHT: 71 IN | SYSTOLIC BLOOD PRESSURE: 122 MMHG | BODY MASS INDEX: 38.58 KG/M2 | DIASTOLIC BLOOD PRESSURE: 80 MMHG | HEART RATE: 92 BPM | OXYGEN SATURATION: 95 % | TEMPERATURE: 99.1 F

## 2025-04-15 DIAGNOSIS — E78.2 MIXED HYPERLIPIDEMIA: ICD-10-CM

## 2025-04-15 DIAGNOSIS — M21.41 FLAT FEET: ICD-10-CM

## 2025-04-15 DIAGNOSIS — E66.01 MORBID OBESITY (H): ICD-10-CM

## 2025-04-15 DIAGNOSIS — Z00.00 ROUTINE GENERAL MEDICAL EXAMINATION AT A HEALTH CARE FACILITY: ICD-10-CM

## 2025-04-15 DIAGNOSIS — N52.2 DRUG-INDUCED ERECTILE DYSFUNCTION: ICD-10-CM

## 2025-04-15 DIAGNOSIS — M21.42 FLAT FEET: ICD-10-CM

## 2025-04-15 DIAGNOSIS — Z13.6 SCREENING FOR CARDIOVASCULAR CONDITION: Primary | ICD-10-CM

## 2025-04-15 DIAGNOSIS — Z12.5 SCREENING FOR PROSTATE CANCER: ICD-10-CM

## 2025-04-15 DIAGNOSIS — M21.41 FLAT FEET, BILATERAL: ICD-10-CM

## 2025-04-15 DIAGNOSIS — I10 HYPERTENSION WITH GOAL BLOOD PRESSURE LESS THAN 140/90: ICD-10-CM

## 2025-04-15 DIAGNOSIS — E78.5 HYPERLIPIDEMIA, UNSPECIFIED HYPERLIPIDEMIA TYPE: ICD-10-CM

## 2025-04-15 DIAGNOSIS — M21.42 FLAT FEET, BILATERAL: ICD-10-CM

## 2025-04-15 LAB
ALBUMIN SERPL BCG-MCNC: 4.3 G/DL (ref 3.5–5.2)
ALP SERPL-CCNC: 107 U/L (ref 40–150)
ALT SERPL W P-5'-P-CCNC: 64 U/L (ref 0–70)
ANION GAP SERPL CALCULATED.3IONS-SCNC: 9 MMOL/L (ref 7–15)
AST SERPL W P-5'-P-CCNC: 38 U/L (ref 0–45)
BILIRUB SERPL-MCNC: 0.6 MG/DL
BUN SERPL-MCNC: 17.1 MG/DL (ref 8–23)
CALCIUM SERPL-MCNC: 9.4 MG/DL (ref 8.8–10.4)
CHLORIDE SERPL-SCNC: 102 MMOL/L (ref 98–107)
CHOLEST SERPL-MCNC: 210 MG/DL
CREAT SERPL-MCNC: 0.98 MG/DL (ref 0.67–1.17)
EGFRCR SERPLBLD CKD-EPI 2021: 88 ML/MIN/1.73M2
ERYTHROCYTE [DISTWIDTH] IN BLOOD BY AUTOMATED COUNT: 12.6 % (ref 10–15)
FASTING STATUS PATIENT QL REPORTED: NO
FASTING STATUS PATIENT QL REPORTED: NO
GLUCOSE SERPL-MCNC: 111 MG/DL (ref 70–99)
HCO3 SERPL-SCNC: 26 MMOL/L (ref 22–29)
HCT VFR BLD AUTO: 44.6 % (ref 40–53)
HDLC SERPL-MCNC: 64 MG/DL
HGB BLD-MCNC: 15.1 G/DL (ref 13.3–17.7)
LDLC SERPL CALC-MCNC: 133 MG/DL
MCH RBC QN AUTO: 30 PG (ref 26.5–33)
MCHC RBC AUTO-ENTMCNC: 33.9 G/DL (ref 31.5–36.5)
MCV RBC AUTO: 89 FL (ref 78–100)
NONHDLC SERPL-MCNC: 146 MG/DL
PLATELET # BLD AUTO: 218 10E3/UL (ref 150–450)
POTASSIUM SERPL-SCNC: 4.9 MMOL/L (ref 3.4–5.3)
PROT SERPL-MCNC: 7.1 G/DL (ref 6.4–8.3)
PSA SERPL DL<=0.01 NG/ML-MCNC: 0.8 NG/ML (ref 0–4.5)
RBC # BLD AUTO: 5.04 10E6/UL (ref 4.4–5.9)
SODIUM SERPL-SCNC: 137 MMOL/L (ref 135–145)
TRIGL SERPL-MCNC: 66 MG/DL
WBC # BLD AUTO: 5.9 10E3/UL (ref 4–11)

## 2025-04-15 PROCEDURE — 36415 COLL VENOUS BLD VENIPUNCTURE: CPT | Performed by: NURSE PRACTITIONER

## 2025-04-15 PROCEDURE — 85027 COMPLETE CBC AUTOMATED: CPT | Performed by: NURSE PRACTITIONER

## 2025-04-15 PROCEDURE — 99396 PREV VISIT EST AGE 40-64: CPT | Performed by: NURSE PRACTITIONER

## 2025-04-15 PROCEDURE — 80053 COMPREHEN METABOLIC PANEL: CPT | Performed by: NURSE PRACTITIONER

## 2025-04-15 PROCEDURE — 1126F AMNT PAIN NOTED NONE PRSNT: CPT | Performed by: NURSE PRACTITIONER

## 2025-04-15 PROCEDURE — 3079F DIAST BP 80-89 MM HG: CPT | Performed by: NURSE PRACTITIONER

## 2025-04-15 PROCEDURE — 80061 LIPID PANEL: CPT | Performed by: NURSE PRACTITIONER

## 2025-04-15 PROCEDURE — G0103 PSA SCREENING: HCPCS | Performed by: NURSE PRACTITIONER

## 2025-04-15 PROCEDURE — 3074F SYST BP LT 130 MM HG: CPT | Performed by: NURSE PRACTITIONER

## 2025-04-15 RX ORDER — LOSARTAN POTASSIUM AND HYDROCHLOROTHIAZIDE 25; 100 MG/1; MG/1
1 TABLET ORAL DAILY
Qty: 90 TABLET | Refills: 3 | Status: SHIPPED | OUTPATIENT
Start: 2025-04-15

## 2025-04-15 RX ORDER — ROSUVASTATIN CALCIUM 10 MG/1
10 TABLET, COATED ORAL DAILY
Qty: 90 TABLET | Refills: 3 | Status: SHIPPED | OUTPATIENT
Start: 2025-04-15 | End: 2025-04-16

## 2025-04-15 RX ORDER — TADALAFIL 10 MG/1
10-20 TABLET ORAL DAILY PRN
Qty: 30 TABLET | Refills: 1 | Status: SHIPPED | OUTPATIENT
Start: 2025-04-15

## 2025-04-15 SDOH — HEALTH STABILITY: PHYSICAL HEALTH: ON AVERAGE, HOW MANY MINUTES DO YOU ENGAGE IN EXERCISE AT THIS LEVEL?: 40 MIN

## 2025-04-15 SDOH — HEALTH STABILITY: PHYSICAL HEALTH: ON AVERAGE, HOW MANY DAYS PER WEEK DO YOU ENGAGE IN MODERATE TO STRENUOUS EXERCISE (LIKE A BRISK WALK)?: 4 DAYS

## 2025-04-15 ASSESSMENT — SOCIAL DETERMINANTS OF HEALTH (SDOH): HOW OFTEN DO YOU GET TOGETHER WITH FRIENDS OR RELATIVES?: TWICE A WEEK

## 2025-04-15 ASSESSMENT — PAIN SCALES - GENERAL: PAINLEVEL_OUTOF10: NO PAIN (0)

## 2025-04-15 NOTE — PATIENT INSTRUCTIONS
Colonoscopy is due August 2026.  We will remind you next year as well    Lets get that pneumonia shot next year    Orders placed for you to get orthotics.    I made sure to get refills to your pharmacy.  I also sent the Cialis to replace the Viagra.  It may have the same side effects to watch out for lightheadedness or dizziness.    If the back/sciatica becomes too bothersome you absolutely can follow with [facility] to get that managed.  We could also do a round of oral steroids to provide you temporary relief if it is quite miserable.    Patient Education   Preventive Care Advice   This is general advice given by our system to help you stay healthy. However, your care team may have specific advice just for you. Please talk to your care team about your preventive care needs.  Nutrition  Eat 5 or more servings of fruits and vegetables each day.  Try wheat bread, brown rice and whole grain pasta (instead of white bread, rice, and pasta).  Get enough calcium and vitamin D. Check the label on foods and aim for 100% of the RDA (recommended daily allowance).  Lifestyle  Exercise at least 150 minutes each week  (30 minutes a day, 5 days a week).  Do muscle strengthening activities 2 days a week. These help control your weight and prevent disease.  No smoking.  Wear sunscreen to prevent skin cancer.  Have a dental exam and cleaning every 6 months.  Yearly exams  See your health care team every year to talk about:  Any changes in your health.  Any medicines your care team has prescribed.  Preventive care, family planning, and ways to prevent chronic diseases.  Shots (vaccines)   HPV shots (up to age 26), if you've never had them before.  Hepatitis B shots (up to age 59), if you've never had them before.  COVID-19 shot: Get this shot when it's due.  Flu shot: Get a flu shot every year.  Tetanus shot: Get a tetanus shot every 10 years.  Pneumococcal, hepatitis A, and RSV shots: Ask your care team if you need these  based on your risk.  Shingles shot (for age 50 and up)  General health tests  Diabetes screening:  Starting at age 35, Get screened for diabetes at least every 3 years.  If you are younger than age 35, ask your care team if you should be screened for diabetes.  Cholesterol test: At age 39, start having a cholesterol test every 5 years, or more often if advised.  Bone density scan (DEXA): At age 50, ask your care team if you should have this scan for osteoporosis (brittle bones).  Hepatitis C: Get tested at least once in your life.  STIs (sexually transmitted infections)  Before age 24: Ask your care team if you should be screened for STIs.  After age 24: Get screened for STIs if you're at risk. You are at risk for STIs (including HIV) if:  You are sexually active with more than one person.  You don't use condoms every time.  You or a partner was diagnosed with a sexually transmitted infection.  If you are at risk for HIV, ask about PrEP medicine to prevent HIV.  Get tested for HIV at least once in your life, whether you are at risk for HIV or not.  Cancer screening tests  Cervical cancer screening: If you have a cervix, begin getting regular cervical cancer screening tests starting at age 21.  Breast cancer scan (mammogram): If you've ever had breasts, begin having regular mammograms starting at age 40. This is a scan to check for breast cancer.  Colon cancer screening: It is important to start screening for colon cancer at age 45.  Have a colonoscopy test every 10 years (or more often if you're at risk) Or, ask your provider about stool tests like a FIT test every year or Cologuard test every 3 years.  To learn more about your testing options, visit:   .  For help making a decision, visit:   https://bit.ly/wx95412.  Prostate cancer screening test: If you have a prostate, ask your care team if a prostate cancer screening test (PSA) at age 55 is right for you.  Lung cancer screening: If you are a current or former  smoker ages 50 to 80, ask your care team if ongoing lung cancer screenings are right for you.  For informational purposes only. Not to replace the advice of your health care provider. Copyright   2023 [facility]. All rights reserved. Clinically reviewed by the [facility] Transitions Program. [employer] [id number] - REV 01/24.

## 2025-04-15 NOTE — PROGRESS NOTES
Preventive Care Visit  [facility]  Willian Wright NP, Family Medicine  Apr 15, 2025        ICD-10-CM    1. Screening for cardiovascular condition  Z13.6       2. Hypertension with goal blood pressure less than 140/90  I10 losartan-hydrochlorothiazide (HYZAAR) 100-25 MG tablet     CBC with platelets     CBC with platelets      3. Hyperlipidemia, unspecified hyperlipidemia type  E78.5 rosuvastatin (CRESTOR) 10 MG tablet     Comprehensive metabolic panel (BMP + Alb, Alk Phos, ALT, AST, Total. Bili, TP)     Comprehensive metabolic panel (BMP + Alb, Alk Phos, ALT, AST, Total. Bili, TP)      4. Drug-induced erectile dysfunction  N52.2 tadalafil (CIALIS) 10 MG tablet      5. Routine general medical examination at a health care facility  Z00.00       6. Mixed hyperlipidemia  E78.2 Lipid panel reflex to direct LDL Non-fasting     Lipid panel reflex to direct LDL Non-fasting      7. Flat feet  M21.41     M21.42       8. Flat feet, bilateral  M21.41 Orthotics, Mastectomy and Custom Compression Orders Type: Orthotic; Orthotic Type Requested: Foot Orthotics    M21.42       9. Screening for prostate cancer  Z12.5 PSA, screen     PSA, screen      10. Morbid obesity (H)  E66.01         Switch out Viagra for Cialis.  Reviewed potential side effects.  Update screening labs.  Colonoscopy due summer 2026.  Discussed getting pneumonia shot.  Plans to get next year.  If back pain worsens, can connect with orthopedics.  Also discussed opportunity of steroid burst.  Keep working on weight loss to help out with back pain as well.    Rocky Stauffer is a 59 year old, presenting for the following:  Physical (Non fasting)        4/15/2025    10:02 AM   Additional Questions   Roomed by Miranda Shrestha CMA          HPI    Having some right knee and low back pain. Getting sciatica into the right leg. No saddle anasthesia or LOC of bowel or bladder.      Had shingles a few months ago.      Advance Care Planning  Patient  does not have a Health Care Directive: previously reviewed      4/15/2025   General Health   How would you rate your overall physical health? Good   Feel stress (tense, anxious, or unable to sleep) Only a little   (!) STRESS CONCERN      4/15/2025   Nutrition   Three or more servings of calcium each day? Yes   Diet: Low salt    Low fat/cholesterol    Carbohydrate counting   How many servings of fruit and vegetables per day? 4 or more   How many sweetened beverages each day? 0-1       Multiple values from one day are sorted in reverse-chronological order         4/15/2025   Exercise   Days per week of moderate/strenous exercise 4 days   Average minutes spent exercising at this level 40 min         4/15/2025   Social Factors   Frequency of gathering with friends or relatives Twice a week   Worry food won't last until get money to buy more No   Food not last or not have enough money for food? No   Do you have housing? (Housing is defined as stable permanent housing and does not include staying ouside in a car, in a tent, in an abandoned building, in an overnight shelter, or couch-surfing.) Yes   Are you worried about losing your housing? No   Lack of transportation? No   Unable to get utilities (heat,electricity)? Yes   Want help with housing or utility concern? No   (!) FINANCIAL RESOURCE STRAIN CONCERN      4/15/2025   Fall Risk   Fallen 2 or more times in the past year? No   Trouble with walking or balance? No          4/15/2025   Dental   Dentist two times every year? Yes     Today's PHQ-2 Score:       4/15/2025     9:58 AM   PHQ-2 ( 1999 [employer])   Q1: Little interest or pleasure in doing things 0   Q2: Feeling down, depressed or hopeless 0   PHQ-2 Score 0    Q1: Little interest or pleasure in doing things Not at all   Q2: Feeling down, depressed or hopeless Not at all   PHQ-2 Score 0       Patient-reported           4/15/2025   Substance Use   Alcohol more than 3/day or more than 7/wk No   Do you use any other  "substances recreationally? No     Social History     Tobacco Use    Smoking status: Never     Passive exposure: Never    Smokeless tobacco: Never   Vaping Use    Vaping status: Never Used   Substance Use Topics    Drug use: Never         4/15/2025   STI Screening   New sexual partner(s) since last STI/HIV test? No   Last PSA:   Prostate Specific Antigen Screen   Date Value Ref Range Status   01/23/2024 1.55 0.00 - 3.50 ng/mL Final   08/12/2021 0.62 0.00 - 3.50 ug/L Final     ASCVD Risk   The 10-year ASCVD risk score (Mirian KRAUS, et al., 2019) is: 8.6%    Values used to calculate the score:      Age: 59 years      Sex: Male      Is Non- : No      Diabetic: No      Tobacco smoker: No      Systolic Blood Pressure: 122 mmHg      Is BP treated: Yes      HDL Cholesterol: 60 mg/dL      Total Cholesterol: 212 mg/dL     Reviewed and updated as needed this visit by Provider        Surg Hx  Kevin Hx            Past Medical History:   Diagnosis Date    Pulmonary nodule 2/20/2017    Identified 11/30/2015 on CTA--needs one year follow-up          Review of Systems  Constitutional, HEENT, cardiovascular, pulmonary, gi and gu systems are negative, except as otherwise noted.     Objective    Exam  /80 (BP Location: Right arm, Patient Position: Sitting, Cuff Size: Adult Large)   Pulse 92   Temp 99.1  F (37.3  C) (Oral)   Resp 18   Ht 1.803 m (5' 11\")   Wt 125 kg (275 lb 9.6 oz)   SpO2 95%   BMI 38.44 kg/m     Estimated body mass index is 38.44 kg/m  as calculated from the following:    Height as of this encounter: 1.803 m (5' 11\").    Weight as of this encounter: 125 kg (275 lb 9.6 oz).    Physical Exam  GENERAL: alert and no distress  EYES: Eyes grossly normal to inspection, PERRL and conjunctivae and sclerae normal  HENT: ear canals and TM's normal, nose and mouth without ulcers or lesions  NECK: no adenopathy, no asymmetry, masses, or scars  RESP: lungs clear to auscultation - no rales, " rhonchi or wheezes  CV: regular rate and rhythm, normal S1 S2, no S3 or S4, no murmur, click or rub, no peripheral edema  ABDOMEN: soft, nontender, no hepatosplenomegaly, no masses and bowel sounds normal  MS: no gross musculoskeletal defects noted, no edema  SKIN: no suspicious lesions or rashes  NEURO: Normal strength and tone, mentation intact and speech normal  PSYCH: mentation appears normal, affect normal/bright        Signed Electronically by: Willian Wright NP

## 2025-04-16 ENCOUNTER — TELEPHONE (OUTPATIENT)
Dept: FAMILY MEDICINE | Facility: CLINIC | Age: 60
End: 2025-04-16
Payer: COMMERCIAL

## 2025-04-16 RX ORDER — ROSUVASTATIN CALCIUM 20 MG/1
20 TABLET, COATED ORAL DAILY
Qty: 90 TABLET | Refills: 3 | Status: SHIPPED | OUTPATIENT
Start: 2025-04-16

## 2025-04-16 NOTE — TELEPHONE ENCOUNTER
Deysi Sood RN called Eh on 4/16 and left a message to return the call to the clinic.  If patient calls back, please route to [facility].    TC please route to RN if any questions, otherwise okay to relay message from provider and schedule a lab only appointment in 8 weeks for lipid profile and hepatic function labs.    MARGARITA Acharya RN  [facility]

## 2025-04-16 NOTE — LETTER
April 21, 2025      Eh Barajas  [address] [place] [id number]        Dear ,    We are writing to inform you of your test results.    Test results indicate you may require additional follow up, see comment below.    Resulted Orders   Lipid panel reflex to direct LDL Non-fasting   Result Value Ref Range    Cholesterol 210 (H) <200 mg/dL    Triglycerides 66 <150 mg/dL    Direct Measure HDL 64 >=40 mg/dL    LDL Cholesterol Calculated 133 (H) <100 mg/dL    Non HDL Cholesterol 146 (H) <130 mg/dL    Patient Fasting > 8hrs? No     Narrative    Cholesterol  Desirable: < 200 mg/dL  Borderline High: 200 - 239 mg/dL  High: >= 240 mg/dL    Triglycerides  Normal: < 150 mg/dL  Borderline High: 150 - 199 mg/dL  High: 200-499 mg/dL  Very High: >= 500 mg/dL    Direct Measure HDL  Female: >= 50 mg/dL   Male: >= 40 mg/dL    LDL Cholesterol  Desirable: < 100 mg/dL  Above Desirable: 100 - 129 mg/dL   Borderline High: 130 - 159 mg/dL   High:  160 - 189 mg/dL   Very High: >= 190 mg/dL    Non HDL Cholesterol  Desirable: < 130 mg/dL  Above Desirable: 130 - 159 mg/dL  Borderline High: 160 - 189 mg/dL  High: 190 - 219 mg/dL  Very High: >= 220 mg/dL   PSA, screen   Result Value Ref Range    Prostate Specific Antigen Screen 0.80 0.00 - 4.50 ng/mL    Narrative    This result is obtained using the Roche Elecsys total PSA method on the mery e801 immunoassay analyzer, which is an ultrasensitive method. Results obtained with different assay methods or kits cannot be used interchangeably.  This test is intended for initial prostate cancer screening. PSA values exceeding the age-specific limits are suspicious for prostate disease, but additional testing, such as prostate biopsy, is needed to diagnose prostate pathology. The American Cancer Society recommends annual examination with digital rectal examination and serum PSA beginning at age 50 and for men with a life expectancy of at least 10 years after detection of prostate  cancer. For men in high-risk groups, such as  Americans or men with a first-degree relative diagnosed at a younger age, testing should begin at a younger age. It is generally recommended that information be provided to patients about the benefits and limitations of testing and treatment so they can make informed decisions.   Comprehensive metabolic panel (BMP + Alb, Alk Phos, ALT, AST, Total. Bili, TP)   Result Value Ref Range    Sodium 137 135 - 145 mmol/L    Potassium 4.9 3.4 - 5.3 mmol/L    Carbon Dioxide (CO2) 26 22 - 29 mmol/L    Anion Gap 9 7 - 15 mmol/L    Urea Nitrogen 17.1 8.0 - 23.0 mg/dL    Creatinine 0.98 0.67 - 1.17 mg/dL    GFR Estimate 88 >60 mL/min/1.73m2      Comment:      eGFR calculated using 2021 CKD-EPI equation.    Calcium 9.4 8.8 - 10.4 mg/dL    Chloride 102 98 - 107 mmol/L    Glucose 111 (H) 70 - 99 mg/dL    Alkaline Phosphatase 107 40 - 150 U/L    AST 38 0 - 45 U/L    ALT 64 0 - 70 U/L    Protein Total 7.1 6.4 - 8.3 g/dL    Albumin 4.3 3.5 - 5.2 g/dL    Bilirubin Total 0.6 <=1.2 mg/dL    Patient Fasting > 8hrs? No    CBC with platelets   Result Value Ref Range    WBC Count 5.9 4.0 - 11.0 10e3/uL    RBC Count 5.04 4.40 - 5.90 10e6/uL    Hemoglobin 15.1 13.3 - 17.7 g/dL    Hematocrit 44.6 40.0 - 53.0 %    MCV 89 78 - 100 fL    MCH 30.0 26.5 - 33.0 pg    MCHC 33.9 31.5 - 36.5 g/dL    RDW 12.6 10.0 - 15.0 %    Platelet Count 218 150 - 450 10e3/uL       If you have any questions or concerns, please call the clinic at the number listed above.     Prostate cancer screen looks good.  Normal blood counts.  Nonfasting blood sugar is perfect along with kidneys, liver, electrolytes.     Cholesterol levels remain stubbornly elevated so I want to try 20 mg of rosuvastatin instead of 10.  I updated this at his pharmacy.  If he could recheck labs in 8 weeks with a lab only appointment, that would be great.     Willian Wright NP      Electronically signed

## 2025-04-16 NOTE — TELEPHONE ENCOUNTER
----- Message from Willian Wright sent at 4/16/2025  7:44 AM CDT -----  Please contact patient    Prostate cancer screen looks good.  Normal blood counts.  Nonfasting blood sugar is perfect along with kidneys, liver, electrolytes.    Cholesterol levels remain stubbornly elevated so I want to try 20 mg of rosuvastatin instead of 10.  I updated this at his pharmacy.  If he could recheck labs in 8 weeks with a lab only appointment, that would be great.    Willian Wright, NP

## 2025-04-18 NOTE — TELEPHONE ENCOUNTER
Left message for patient to return call.     If patient calls back, please relay message from provider.   Also schedule a lab only appointment in 8 weeks for lipid profile and hepatic function labs per PCP request.     Route to RN with any questions.     Karen Dougherty RN  [facility]

## 2025-04-21 NOTE — TELEPHONE ENCOUNTER
Left message for patient to return call. If patient calls back, please route to [facility].     TCs, please send to RNs.    Third outreach attempt, sending the patient a letter and signing encounter.    Elder Brewer RN  [facility]

## 2025-05-05 ENCOUNTER — TELEPHONE (OUTPATIENT)
Dept: FAMILY MEDICINE | Facility: CLINIC | Age: 60
End: 2025-05-05
Payer: COMMERCIAL

## 2025-05-05 NOTE — TELEPHONE ENCOUNTER
Forms/Letter Request    Type of form/letter: OTHER: MED RECORD RELEASE FORM -[employer]        Do we have the form/letter: Yes: In Willian's basket     Who is the form from? PATIENT  (if other please explain)    Where did/will the form come from? Patient or family brought in       When is form/letter needed by: WHEN COMPLETED     How would you like the form/letter returned: Fax : [phone]    Patient Notified form requests are processed in 5-7 business days:Yes    Could we send this information to you in [employer] or would you prefer to receive a phone call?:   Patient would prefer a phone call   Okay to leave a detailed message?: Yes at Cell number on file:    Telephone Information:   Mobile [phone]      IF FURTHER QUESTIONS YOU CAN CALL PATIENT AND LEAVE DETAILED MESSAGE-  Form is for plasma donation and his sleep apnea. Pt did want me to let you know he uses his sleep apnea machine every night.

## 2025-05-05 NOTE — TELEPHONE ENCOUNTER
Form prepped and up for NK to review, complete and sign.    Mimi Jansen MA on 5/5/2025 at 2:51 PM

## 2025-05-06 NOTE — TELEPHONE ENCOUNTER
Form faxed, and a copy was sent to HIM     A hard copy will be retained in the corresponding provider's hold bin for the next 90 days.

## 2025-05-15 ENCOUNTER — TELEPHONE (OUTPATIENT)
Dept: FAMILY MEDICINE | Facility: CLINIC | Age: 60
End: 2025-05-15
Payer: COMMERCIAL

## 2025-05-15 NOTE — TELEPHONE ENCOUNTER
Forms/Letter Request    Type of form/letter: OTHER: form for donating plasma        Do we have the form/letter: Yes: placed in [employer] in Willian's basket    Who is the form from? Patient    Where did/will the form come from? Patient or family brought in       When is form/letter needed by: ASAP when completed     How would you like the form/letter returned: Fax : [email or web address]   Or fax number     Patient Notified form requests are processed in 5-7 business days:Yes    Could we send this information to you in [employer] or would you prefer to receive a phone call?:   Patient would prefer a phone call   Okay to leave a detailed message?: Yes at Cell number on file:    Telephone Information:   Mobile [phone]     Pt needs aspirin removed from his med list in order to donate plasma-He states he has not taken aspirin in over 1 year.     If you could please call him and leave a message when completed so that he knows when he can schedule.

## 2025-05-16 NOTE — TELEPHONE ENCOUNTER
Update med list and attached copy.    Form prepped and placed in provider's to do basket for completion.

## 2025-05-20 NOTE — TELEPHONE ENCOUNTER
Left voicemail for the patient informing him that provider has completed the [employer] screening form. Also informing the patient that the form has been faxed to [employer] and scanned into his file at the clinic.    Shruti Mahmood MA on 5/20/2025 at 9:57 AM

## 2025-06-16 DIAGNOSIS — I10 HYPERTENSION WITH GOAL BLOOD PRESSURE LESS THAN 140/90: ICD-10-CM

## 2025-06-16 RX ORDER — LOSARTAN POTASSIUM AND HYDROCHLOROTHIAZIDE 25; 100 MG/1; MG/1
1 TABLET ORAL DAILY
Qty: 90 TABLET | Refills: 2 | Status: SHIPPED | OUTPATIENT
Start: 2025-06-16

## 2025-08-27 ENCOUNTER — TELEPHONE (OUTPATIENT)
Dept: FAMILY MEDICINE | Facility: CLINIC | Age: 60
End: 2025-08-27
Payer: COMMERCIAL